# Patient Record
Sex: FEMALE | Race: OTHER
[De-identification: names, ages, dates, MRNs, and addresses within clinical notes are randomized per-mention and may not be internally consistent; named-entity substitution may affect disease eponyms.]

---

## 2022-10-19 ENCOUNTER — HOSPITAL ENCOUNTER (INPATIENT)
Dept: HOSPITAL 47 - EC | Age: 82
LOS: 6 days | Discharge: NURSING, REHAB OR LONG-TERM CARE | DRG: 643 | End: 2022-10-25
Attending: FAMILY MEDICINE | Admitting: FAMILY MEDICINE
Payer: MEDICARE

## 2022-10-19 VITALS — BODY MASS INDEX: 21.5 KG/M2

## 2022-10-19 DIAGNOSIS — I25.10: ICD-10-CM

## 2022-10-19 DIAGNOSIS — T68.XXXA: ICD-10-CM

## 2022-10-19 DIAGNOSIS — I44.7: ICD-10-CM

## 2022-10-19 DIAGNOSIS — Z79.899: ICD-10-CM

## 2022-10-19 DIAGNOSIS — Z79.84: ICD-10-CM

## 2022-10-19 DIAGNOSIS — E83.42: ICD-10-CM

## 2022-10-19 DIAGNOSIS — Z79.02: ICD-10-CM

## 2022-10-19 DIAGNOSIS — S00.81XA: ICD-10-CM

## 2022-10-19 DIAGNOSIS — E86.0: ICD-10-CM

## 2022-10-19 DIAGNOSIS — I50.21: ICD-10-CM

## 2022-10-19 DIAGNOSIS — I95.9: ICD-10-CM

## 2022-10-19 DIAGNOSIS — Z88.8: ICD-10-CM

## 2022-10-19 DIAGNOSIS — E22.2: Primary | ICD-10-CM
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
## 2022-10-19 DIAGNOSIS — U07.1: ICD-10-CM

## 2022-10-19 DIAGNOSIS — I11.0: ICD-10-CM

## 2022-10-19 DIAGNOSIS — G93.41: ICD-10-CM

## 2022-10-19 DIAGNOSIS — S81.812A: ICD-10-CM

## 2022-10-19 DIAGNOSIS — Z71.3: ICD-10-CM

## 2022-10-19 DIAGNOSIS — I48.91: ICD-10-CM

## 2022-10-19 DIAGNOSIS — E11.65: ICD-10-CM

## 2022-10-19 DIAGNOSIS — E78.5: ICD-10-CM

## 2022-10-19 DIAGNOSIS — E86.1: ICD-10-CM

## 2022-10-19 DIAGNOSIS — R26.9: ICD-10-CM

## 2022-10-19 DIAGNOSIS — E44.0: ICD-10-CM

## 2022-10-19 LAB
ALBUMIN SERPL-MCNC: 3.1 G/DL (ref 3.5–5)
ALP SERPL-CCNC: 58 U/L (ref 38–126)
ALT SERPL-CCNC: 25 U/L (ref 4–34)
ANION GAP SERPL CALC-SCNC: 9 MMOL/L
APTT BLD: 28.8 SEC (ref 22–30)
AST SERPL-CCNC: 38 U/L (ref 14–36)
BASOPHILS # BLD AUTO: 0.1 K/UL (ref 0–0.2)
BASOPHILS NFR BLD AUTO: 2 %
BUN SERPL-SCNC: 25 MG/DL (ref 7–17)
CALCIUM SPEC-MCNC: 7.4 MG/DL (ref 8.4–10.2)
CHLORIDE SERPL-SCNC: 87 MMOL/L (ref 98–107)
CK SERPL-CCNC: 634 U/L (ref 30–135)
CO2 SERPL-SCNC: 17 MMOL/L (ref 22–30)
EOSINOPHIL # BLD AUTO: 0 K/UL (ref 0–0.7)
EOSINOPHIL NFR BLD AUTO: 0 %
ERYTHROCYTE [DISTWIDTH] IN BLOOD BY AUTOMATED COUNT: 3.68 M/UL (ref 3.8–5.4)
ERYTHROCYTE [DISTWIDTH] IN BLOOD: 12.1 % (ref 11.5–15.5)
GLUCOSE BLD-MCNC: 116 MG/DL (ref 70–110)
GLUCOSE SERPL-MCNC: 202 MG/DL (ref 74–99)
HCT VFR BLD AUTO: 34 % (ref 34–46)
HGB BLD-MCNC: 12.7 GM/DL (ref 11.4–16)
INR PPP: 1.1 (ref ?–1.2)
LYMPHOCYTES # SPEC AUTO: 0.4 K/UL (ref 1–4.8)
LYMPHOCYTES NFR SPEC AUTO: 9 %
MCH RBC QN AUTO: 34.4 PG (ref 25–35)
MCHC RBC AUTO-ENTMCNC: 37.3 G/DL (ref 31–37)
MCV RBC AUTO: 92.3 FL (ref 80–100)
MONOCYTES # BLD AUTO: 0.4 K/UL (ref 0–1)
MONOCYTES NFR BLD AUTO: 9 %
NEUTROPHILS # BLD AUTO: 3.2 K/UL (ref 1.3–7.7)
NEUTROPHILS NFR BLD AUTO: 79 %
PLATELET # BLD AUTO: 224 K/UL (ref 150–450)
POTASSIUM SERPL-SCNC: 4.7 MMOL/L (ref 3.5–5.1)
PROT SERPL-MCNC: 5.1 G/DL (ref 6.3–8.2)
PT BLD: 12.1 SEC (ref 9–12)
SODIUM SERPL-SCNC: 113 MMOL/L (ref 137–145)
TROPONIN I SERPL-MCNC: 0.02 NG/ML (ref 0–0.03)
WBC # BLD AUTO: 4.1 K/UL (ref 3.8–10.6)

## 2022-10-19 PROCEDURE — 83036 HEMOGLOBIN GLYCOSYLATED A1C: CPT

## 2022-10-19 PROCEDURE — 70496 CT ANGIOGRAPHY HEAD: CPT

## 2022-10-19 PROCEDURE — 82570 ASSAY OF URINE CREATININE: CPT

## 2022-10-19 PROCEDURE — 83935 ASSAY OF URINE OSMOLALITY: CPT

## 2022-10-19 PROCEDURE — 36415 COLL VENOUS BLD VENIPUNCTURE: CPT

## 2022-10-19 PROCEDURE — 83735 ASSAY OF MAGNESIUM: CPT

## 2022-10-19 PROCEDURE — 93005 ELECTROCARDIOGRAM TRACING: CPT

## 2022-10-19 PROCEDURE — 84443 ASSAY THYROID STIM HORMONE: CPT

## 2022-10-19 PROCEDURE — 85025 COMPLETE CBC W/AUTO DIFF WBC: CPT

## 2022-10-19 PROCEDURE — 81001 URINALYSIS AUTO W/SCOPE: CPT

## 2022-10-19 PROCEDURE — 84133 ASSAY OF URINE POTASSIUM: CPT

## 2022-10-19 PROCEDURE — 70498 CT ANGIOGRAPHY NECK: CPT

## 2022-10-19 PROCEDURE — 99291 CRITICAL CARE FIRST HOUR: CPT

## 2022-10-19 PROCEDURE — 80053 COMPREHEN METABOLIC PANEL: CPT

## 2022-10-19 PROCEDURE — 82533 TOTAL CORTISOL: CPT

## 2022-10-19 PROCEDURE — 96361 HYDRATE IV INFUSION ADD-ON: CPT

## 2022-10-19 PROCEDURE — 80048 BASIC METABOLIC PNL TOTAL CA: CPT

## 2022-10-19 PROCEDURE — 84484 ASSAY OF TROPONIN QUANT: CPT

## 2022-10-19 PROCEDURE — 96365 THER/PROPH/DIAG IV INF INIT: CPT

## 2022-10-19 PROCEDURE — 71045 X-RAY EXAM CHEST 1 VIEW: CPT

## 2022-10-19 PROCEDURE — 87635 SARS-COV-2 COVID-19 AMP PRB: CPT

## 2022-10-19 PROCEDURE — 82607 VITAMIN B-12: CPT

## 2022-10-19 PROCEDURE — 83930 ASSAY OF BLOOD OSMOLALITY: CPT

## 2022-10-19 PROCEDURE — 85730 THROMBOPLASTIN TIME PARTIAL: CPT

## 2022-10-19 PROCEDURE — 84295 ASSAY OF SERUM SODIUM: CPT

## 2022-10-19 PROCEDURE — 72125 CT NECK SPINE W/O DYE: CPT

## 2022-10-19 PROCEDURE — 82550 ASSAY OF CK (CPK): CPT

## 2022-10-19 PROCEDURE — 87040 BLOOD CULTURE FOR BACTERIA: CPT

## 2022-10-19 PROCEDURE — 85610 PROTHROMBIN TIME: CPT

## 2022-10-19 PROCEDURE — 82553 CREATINE MB FRACTION: CPT

## 2022-10-19 PROCEDURE — 82746 ASSAY OF FOLIC ACID SERUM: CPT

## 2022-10-19 PROCEDURE — 93306 TTE W/DOPPLER COMPLETE: CPT

## 2022-10-19 PROCEDURE — 70450 CT HEAD/BRAIN W/O DYE: CPT

## 2022-10-19 PROCEDURE — 84300 ASSAY OF URINE SODIUM: CPT

## 2022-10-19 NOTE — CT
EXAMINATION TYPE: CT brain wo con for TPA

CT DLP: 1580.8 mGycm, Automated exposure control for dose reduction was used.

 

DATE OF EXAM: 10/19/2022 8:08 PM

 

COMPARISON: None. 

 

CLINICAL INDICATION:Female, 82 years old with history of Neuro deficit, acute, stroke suspected,

 

TECHNIQUE: 

Brain: Axial CT images of the brain were obtained with coronal and sagittal reformats created and rev
iewed.

Contrast used: None.

Oral contrast used: None.

 

FINDINGS:

 

Brain:

Extra-axial spaces: No abnormal extra-axial fluid collections.

Ventricular system: Dilatation in proportion to cerebral atrophy.

Cerebral parenchyma: Cerebral atrophy. No acute intraparenchymal hemorrhage or mass effect.  The gray
-white junction is well differentiated. Scattered hypoattenuating areas are seen within the white mat
ter.  

Cerebellum: Unremarkable.

Mass effect: No evidence of midline shift.

Intracranial vasculature: unremarkable

Soft tissues: Left frontal soft tissue edema.

Calvarium/osseous structures: No depressed skull fracture.

Paranasal sinuses and mastoid air cells: Mild scattered paranasal sinus disease.

Visualized orbits: Bilateral aphakia

 

IMPRESSION:

1. No acute intracranial process.

2. Nonspecific white matter changes, likely secondary to chronic small vessel ischemic disease.

3. Left frontal soft tissue edema.

## 2022-10-19 NOTE — CT
EXAMINATION TYPE: CT angio head neck

CT DLP: 1580.8 mGycm, Automated exposure control for dose reduction was used.

 

DATE OF EXAM: 10/19/2022 8:32 PM

 

COMPARISON: CT head same day.  

 

CLINICAL INDICATION:Female, 82 years old with history of Neuro deficit, acute, stroke suspected, foun
d unresponsive

 

TECHNIQUE: Axially acquired helical CT angiogram of the head and neck was obtained with contrast. Axi
al images are supplemented with 3D reconstructions which were post-processed at an independent workst
ation. NASCET criteria used.

Contrast used:65ml mL of Isovue 370 with IV Contrast, 

Oral contrast used: None. 

 

FINDINGS:

Motion limits evaluation.

 

CTA HEAD:

No evidence of acute intracranial hemorrhage, mass effect, or midline shift. The ventricles, sulci, a
nd cisterns are unremarkable. 

 

The visualized portions of the internal carotid arteries, middle cerebral arteries, anterior cerebral
 arteries, and posterior cerebral arteries are patent. 

 

The basilar and vertebral arteries are patent. 

 

CTA NECK:

Right Carotid System: 

The common carotid artery and external carotid artery are patent. The carotid bifurcation demonstrate
s no evidence of hemodynamically significant stenosis. The remaining portions of the internal carotid
 artery demonstrate normal size without significant narrowing.

 

Left Carotid System: 

The common carotid artery and external carotid artery are patent. The carotid bifurcation demonstrate
s no evidence of hemodynamically significant stenosis. The remaining portions of the internal carotid
 artery demonstrate normal size without significant narrowing.

 

Vertebral arteries are patent without evidence hemodynamically significant stenosis.

 

There is a three-vessel aortic arch. The origins of the great vessels are patent. No evidence of hemo
dynamically significant stenosis.

 

IMPRESSION:

1. No evidence of dissection of the cervical internal carotid arteries or vertebral arteries or any e
vidence of significant stenosis at the carotid bifurcations. 

2. No evidence of intracranial high-grade stenosis or intracranial aneurysm.

## 2022-10-19 NOTE — CT
EXAMINATION TYPE: CT cervical spine wo con

CT DLP: 1580.8 mGycm, Automated exposure control for dose reduction was used.

 

DATE OF EXAM: 10/19/2022 8:10 PM

 

COMPARISON: None

 

CLINICAL INDICATION:Female, 82 years old with history of fall injury, found unresponsive

 

TECHNIQUE: Axial CT images from the skull base to the inferior aspect of T2 we obtained without intra
venous contrast. Coronal and sagittal reformatted images were also reviewed.

 

FINDINGS:

Fracture: None.

Osseous structures: Multilevel degenerative disc disease changes with endplate spurring and disc oste
ophyte complex's. 

Vertebral alignment: Alignment within normal limits.

Spinal canal/Neural Foramina: No evidence of significant spinal canal narrowing. No evidence for sign
ificant neural foraminal stenosis.

Neck soft tissues: Prevertebral soft tissues are within normal limits.

Other: The airway is patent. The lung apices are clear.

 

IMPRESSION:

1. No evidence of cervical spine fracture.

2. Mild multilevel degenerative disc disease.

## 2022-10-19 NOTE — ED
Altered Mental Status HPI





- General


Stated Complaint: hypothermia


Source: EMS


Mode of arrival: EMS


Limitations: altered mental status





- History of Present Illness


Initial Comments: 





This patient is an 82-year-old woman brought by EMS to have evaluation for 

altered mental status.  The patient reportedly observed lying on the ground by a

neighbor.  Patient found by EMS personnel to be in wet clothing, attempting to 

crawl.  Patient's not giving any verbal responses.  The down time unknown 

however patient does feel cold to touch per ambulance personnel.


MD Complaint: altered mental status


-: unknown


Severity: severe


Consistency of Symptoms: constant


Context: unknown





- Related Data


                                Home Medications











 Medication  Instructions  Recorded  Confirmed


 


Atorvastatin [Lipitor] 20 mg PO HS 10/19/22 10/19/22


 


Clopidogrel [Plavix] 75 mg PO DAILY 10/19/22 10/19/22


 


Furosemide [Lasix] 20 mg PO DAILY 10/19/22 10/19/22


 


carvediloL [Coreg] 6.25 mg PO BID-W/MEALS 10/19/22 10/19/22


 


lisinopriL [Zestril] 10 mg PO DAILY 10/19/22 10/19/22








                                  Previous Rx's











 Medication  Instructions  Recorded


 


ALPRAZolam [Xanax] 0.5 mg PO TID PRN #9 tablet 10/25/22


 


Acetaminophen Tab [Tylenol] 1,000 mg PO Q6HR PRN  tab 10/25/22


 


Artificial Tears-Hypromellose 1 drops BOTH EYES Q4HR PRN  ml 10/25/22





[Artificial Tear Drops]  


 


Ascorbic Acid [Vitamin C] 500 mg PO DAILY  tab 10/25/22


 


Cholecalciferol [Vitamin D3 (10 10 mcg PO DAILY  tab 10/25/22





Mcg = 400 Iu)]  


 


Famotidine [Pepcid] 20 mg PO BID  tab 10/25/22


 


Folic Acid 1 mg PO DAILY  tab 10/25/22


 


Psyllium Husk 100% [Metamucil 6 gm PO BID PRN  packet 10/25/22





Packet]  


 


Sodium Chloride Tab 1 gm PO BID  tab 10/25/22


 


Zinc Sulfate [Orazinc] 220 mg PO DAILY  cap 10/25/22


 


metFORMIN HCL [Glucophage] 500 mg PO BID-W/MEALS  tab 10/25/22











                                    Allergies











Allergy/AdvReac Type Severity Reaction Status Date / Time


 


heparin AdvReac  Swelling Verified 10/24/22 08:55














Review of Systems


ROS Statement: 


Those systems with pertinent positive or pertinent negative responses have been 

documented in the HPI.





ROS Other: All systems not noted in ROS Statement are negative.


Limitations: ROS unobtainable due to patients medical condition





General Exam


Limitations: no limitations


General appearance: obtunded, other (Patient arrives covered in blankets.  Her 

clothing had been removed by EMS personnel as she was very wet.  Patient not 

responding to questions or commands.)


Head exam: Present: other (Patient has facial abrasions)


Eye exam: Present: PERRL, EOMI, nystagmus.  Absent: scleral icterus, 

conjunctival injection


Neck exam: Present: normal inspection, other (In cervical collar)


Respiratory exam: Present: rhonchi.  Absent: wheezes, rales, stridor, accessory 

muscle use, decreased breath sounds, prolonged expiratory


Cardiovascular Exam: Present: bradycardia, irregular rhythm, systolic murmur.  

Absent: diastolic murmur, rubs, gallop


GI/Abdominal exam: Present: soft.  Absent: distended, tenderness, guarding, 

rebound, rigid, mass


Extremities exam: Present: full ROM, other (Abrasions).  Absent: tenderness, 

pedal edema, calf tenderness


Back exam: Present: normal inspection.  Absent: vertebral tenderness


Neurological exam: Present: altered.  Absent: motor sensory deficit


  ** Expanded


Eye Response: (4) open spontaneously


Motor Response: (5) localizes to pain


Verbal Response: incomprehensible sounds


Skin exam: Present: mottled, abrasion, other (Patient is cold, skin is moist.  

There are multiple abrasions to all 4 extremities as well as abdomen and face.)





Course


                                   Vital Signs











  10/19/22 10/19/22 10/19/22





  18:46 20:39 20:45


 


Temperature 79.9 F L 93.6 F L 


 


Pulse Rate 53 L 51 L 58 L


 


Respiratory 18 11 L 17





Rate   


 


Blood Pressure 108/88 155/100 155/100


 


O2 Sat by Pulse 98 100 100





Oximetry   














  10/19/22 10/19/22 10/19/22





  20:54 21:00 21:15


 


Temperature 84.4 F L  


 


Pulse Rate 57 L 61 63


 


Respiratory 18 19 21





Rate   


 


Blood Pressure 137/79 137/79 129/78


 


O2 Sat by Pulse 95 96 99





Oximetry   














  10/19/22 10/19/22 10/19/22





  21:30 21:45 22:00


 


Temperature   


 


Pulse Rate 66 65 65


 


Respiratory 17 17 16





Rate   


 


Blood Pressure 137/69 111/67 131/60


 


O2 Sat by Pulse 96 100 85 L





Oximetry   














  10/19/22 10/19/22 10/19/22





  22:15 22:30 22:45


 


Temperature   


 


Pulse Rate 69 76 73


 


Respiratory 11 L 18 21





Rate   


 


Blood Pressure 90/60 91/53 113/68


 


O2 Sat by Pulse 97 96 96





Oximetry   














  10/19/22 10/19/22 10/19/22





  23:00 23:14 23:15


 


Temperature  90.9 F L 


 


Pulse Rate 71 75 73


 


Respiratory 17 18 16





Rate   


 


Blood Pressure 104/68 100/64 100/64


 


O2 Sat by Pulse 98 94 L 96





Oximetry   














- Reevaluation(s)


Reevaluation #1: 





10/19/22 22:18


Patient's family member had arrived and stated that she had not been feeling 

well for number days prior to this episode.  Patient reportedly having cough, 

generalized weakness and fatigue. She had been encouraged to take a covid test 

on Saturday but did not do so.





Medical Decision Making





- Lab Data


Result diagrams: 


                                 10/25/22 06:49





                                 10/25/22 06:49


                                   Lab Results











  10/19/22 10/19/22 10/19/22 Range/Units





  19:08 19:08 19:08 


 


WBC  4.1    (3.8-10.6)  k/uL


 


RBC  3.68 L    (3.80-5.40)  m/uL


 


Hgb  12.7    (11.4-16.0)  gm/dL


 


Hct  34.0    (34.0-46.0)  %


 


MCV  92.3    (80.0-100.0)  fL


 


MCH  34.4    (25.0-35.0)  pg


 


MCHC  37.3 H    (31.0-37.0)  g/dL


 


RDW  12.1    (11.5-15.5)  %


 


Plt Count  224    (150-450)  k/uL


 


MPV  7.1    


 


Neutrophils %  79    %


 


Lymphocytes %  9    %


 


Monocytes %  9    %


 


Eosinophils %  0    %


 


Basophils %  2    %


 


Neutrophils #  3.2    (1.3-7.7)  k/uL


 


Lymphocytes #  0.4 L    (1.0-4.8)  k/uL


 


Monocytes #  0.4    (0-1.0)  k/uL


 


Eosinophils #  0.0    (0-0.7)  k/uL


 


Basophils #  0.1    (0-0.2)  k/uL


 


PT   12.1 H   (9.0-12.0)  sec


 


INR   1.1   (<1.2)  


 


APTT   28.8   (22.0-30.0)  sec


 


Sodium    113 L*  (137-145)  mmol/L


 


Potassium    4.7  (3.5-5.1)  mmol/L


 


Chloride    87 L  ()  mmol/L


 


Carbon Dioxide    17 L  (22-30)  mmol/L


 


Anion Gap    9  mmol/L


 


BUN    25 H  (7-17)  mg/dL


 


Creatinine    0.64  (0.52-1.04)  mg/dL


 


Est GFR (CKD-EPI)AfAm    >90  (>60 ml/min/1.73 sqM)  


 


Est GFR (CKD-EPI)NonAf    82  (>60 ml/min/1.73 sqM)  


 


Glucose    202 H  (74-99)  mg/dL


 


Osmolality     (280-301)  mosm/kg


 


Calcium    7.4 L  (8.4-10.2)  mg/dL


 


Total Bilirubin    0.6  (0.2-1.3)  mg/dL


 


AST    38 H  (14-36)  U/L


 


ALT    25  (4-34)  U/L


 


Alkaline Phosphatase    58  ()  U/L


 


Creatine Kinase    634 H  ()  U/L


 


CK-MB (CK-2)     (0.0-2.4)  ng/mL


 


Troponin I     (0.000-0.034)  ng/mL


 


Total Protein    5.1 L  (6.3-8.2)  g/dL


 


Albumin    3.1 L  (3.5-5.0)  g/dL


 


Coronavirus (PCR)     (Not Detectd)  














  10/19/22 10/19/22 10/19/22 Range/Units





  19:08 19:08 21:39 


 


WBC     (3.8-10.6)  k/uL


 


RBC     (3.80-5.40)  m/uL


 


Hgb     (11.4-16.0)  gm/dL


 


Hct     (34.0-46.0)  %


 


MCV     (80.0-100.0)  fL


 


MCH     (25.0-35.0)  pg


 


MCHC     (31.0-37.0)  g/dL


 


RDW     (11.5-15.5)  %


 


Plt Count     (150-450)  k/uL


 


MPV     


 


Neutrophils %     %


 


Lymphocytes %     %


 


Monocytes %     %


 


Eosinophils %     %


 


Basophils %     %


 


Neutrophils #     (1.3-7.7)  k/uL


 


Lymphocytes #     (1.0-4.8)  k/uL


 


Monocytes #     (0-1.0)  k/uL


 


Eosinophils #     (0-0.7)  k/uL


 


Basophils #     (0-0.2)  k/uL


 


PT     (9.0-12.0)  sec


 


INR     (<1.2)  


 


APTT     (22.0-30.0)  sec


 


Sodium     (137-145)  mmol/L


 


Potassium     (3.5-5.1)  mmol/L


 


Chloride     ()  mmol/L


 


Carbon Dioxide     (22-30)  mmol/L


 


Anion Gap     mmol/L


 


BUN     (7-17)  mg/dL


 


Creatinine     (0.52-1.04)  mg/dL


 


Est GFR (CKD-EPI)AfAm     (>60 ml/min/1.73 sqM)  


 


Est GFR (CKD-EPI)NonAf     (>60 ml/min/1.73 sqM)  


 


Glucose     (74-99)  mg/dL


 


Osmolality   254 L   (280-301)  mosm/kg


 


Calcium     (8.4-10.2)  mg/dL


 


Total Bilirubin     (0.2-1.3)  mg/dL


 


AST     (14-36)  U/L


 


ALT     (4-34)  U/L


 


Alkaline Phosphatase     ()  U/L


 


Creatine Kinase     ()  U/L


 


CK-MB (CK-2)  15.4 H    (0.0-2.4)  ng/mL


 


Troponin I  0.018    (0.000-0.034)  ng/mL


 


Total Protein     (6.3-8.2)  g/dL


 


Albumin     (3.5-5.0)  g/dL


 


Coronavirus (PCR)    Detected A  (Not Detectd)  














- EKG Data


-: EKG Interpreted by Me


EKG shows normal: intervals (QRS duration 178 ms, prolonged consistent with the 

left bundle-branch block.), QRS complexes ((Bundle-branch block pattern.)


Interpretation: other (Underlying rhythm appears atrial fibrillation with slow 

ventricular response.)





Critical Care Time


Critical Care Time: Yes (45 minutes)





Disposition


Clinical Impression: 


 Hyponatremia, Hypothermia, Altered mental status, Hyperglycemia, COVID-19





Disposition: ADMITTED AS IP TO THIS [facility]


Condition: Critical


Is patient prescribed a controlled substance at d/c from ED?: No

## 2022-10-19 NOTE — XR
EXAMINATION TYPE: XR chest 1V portable

 

DATE OF EXAM: 10/19/2022 8:04 PM

 

COMPARISON: None

 

TECHNIQUE: XR chest 1V portable Portable AP radiograph of the chest.

 

CLINICAL INDICATION:Female, 82 years old with history of altered mental status; 

 

FINDINGS: 

Lungs/Pleura: There is no evidence of pleural effusion, focal consolidation, or pneumothorax.  

Pulmonary vascularity: Pulmonary vascular congestion.

Heart/mediastinum: Cardiomediastinal silhouette is enlarged and stable.

Musculoskeletal: Degenerative changes of the shoulder joints.

 

IMPRESSION: 

Cardiomegaly and mild pulmonary vascular congestion. Correlate with BNP for congestive heart failure.

## 2022-10-20 LAB
ALBUMIN SERPL-MCNC: 2.9 G/DL (ref 3.5–5)
ALP SERPL-CCNC: 52 U/L (ref 38–126)
ALT SERPL-CCNC: 28 U/L (ref 4–34)
ANION GAP SERPL CALC-SCNC: 11 MMOL/L
AST SERPL-CCNC: 53 U/L (ref 14–36)
BASOPHILS # BLD AUTO: 0 K/UL (ref 0–0.2)
BASOPHILS NFR BLD AUTO: 0 %
BUN SERPL-SCNC: 25 MG/DL (ref 7–17)
CALCIUM SPEC-MCNC: 7.9 MG/DL (ref 8.4–10.2)
CHLORIDE SERPL-SCNC: 85 MMOL/L (ref 98–107)
CO2 SERPL-SCNC: 19 MMOL/L (ref 22–30)
EOSINOPHIL # BLD AUTO: 0 K/UL (ref 0–0.7)
EOSINOPHIL NFR BLD AUTO: 0 %
ERYTHROCYTE [DISTWIDTH] IN BLOOD BY AUTOMATED COUNT: 3.73 M/UL (ref 3.8–5.4)
ERYTHROCYTE [DISTWIDTH] IN BLOOD: 12.1 % (ref 11.5–15.5)
GLUCOSE BLD-MCNC: 107 MG/DL (ref 70–110)
GLUCOSE BLD-MCNC: 139 MG/DL (ref 70–110)
GLUCOSE BLD-MCNC: 148 MG/DL (ref 70–110)
GLUCOSE BLD-MCNC: 59 MG/DL (ref 70–110)
GLUCOSE BLD-MCNC: 67 MG/DL (ref 70–110)
GLUCOSE BLD-MCNC: 88 MG/DL (ref 70–110)
GLUCOSE SERPL-MCNC: 69 MG/DL (ref 74–99)
HCT VFR BLD AUTO: 34.5 % (ref 34–46)
HGB BLD-MCNC: 12.3 GM/DL (ref 11.4–16)
HYALINE CASTS UR QL AUTO: 1 /LPF (ref 0–2)
KETONES UR QL STRIP.AUTO: (no result)
LYMPHOCYTES # SPEC AUTO: 0.2 K/UL (ref 1–4.8)
LYMPHOCYTES NFR SPEC AUTO: 2 %
MCH RBC QN AUTO: 33 PG (ref 25–35)
MCHC RBC AUTO-ENTMCNC: 35.6 G/DL (ref 31–37)
MCV RBC AUTO: 92.5 FL (ref 80–100)
MONOCYTES # BLD AUTO: 0.5 K/UL (ref 0–1)
MONOCYTES NFR BLD AUTO: 7 %
NEUTROPHILS # BLD AUTO: 7 K/UL (ref 1.3–7.7)
NEUTROPHILS NFR BLD AUTO: 90 %
PH UR: 6 [PH] (ref 5–8)
PLATELET # BLD AUTO: 265 K/UL (ref 150–450)
POTASSIUM SERPL-SCNC: 4 MMOL/L (ref 3.5–5.1)
POTASSIUM UR-SCNC: 48.1 MMOL/L (ref 25–125)
PROT SERPL-MCNC: 4.7 G/DL (ref 6.3–8.2)
RBC UR QL: 33 /HPF (ref 0–5)
SODIUM SERPL-SCNC: 115 MMOL/L (ref 137–145)
SP GR UR: 1.03 (ref 1–1.03)
SQUAMOUS UR QL AUTO: 1 /HPF (ref 0–4)
UROBILINOGEN UR QL STRIP: <2 MG/DL (ref ?–2)
WBC # BLD AUTO: 7.7 K/UL (ref 3.8–10.6)
WBC #/AREA URNS HPF: 5 /HPF (ref 0–5)

## 2022-10-20 RX ADMIN — POTASSIUM CHLORIDE SCH: 14.9 INJECTION, SOLUTION INTRAVENOUS at 08:03

## 2022-10-20 RX ADMIN — OXYCODONE HYDROCHLORIDE AND ACETAMINOPHEN SCH MG: 500 TABLET ORAL at 12:13

## 2022-10-20 RX ADMIN — HEPARIN SODIUM SCH UNIT: 5000 INJECTION INTRAVENOUS; SUBCUTANEOUS at 08:22

## 2022-10-20 RX ADMIN — ACETAMINOPHEN PRN MG: 500 TABLET ORAL at 19:54

## 2022-10-20 RX ADMIN — FAMOTIDINE SCH MG: 10 INJECTION, SOLUTION INTRAVENOUS at 19:54

## 2022-10-20 RX ADMIN — ACETAMINOPHEN PRN MG: 500 TABLET ORAL at 12:18

## 2022-10-20 RX ADMIN — HEPARIN SODIUM SCH UNIT: 5000 INJECTION INTRAVENOUS; SUBCUTANEOUS at 02:08

## 2022-10-20 RX ADMIN — Medication SCH MG: at 12:13

## 2022-10-20 RX ADMIN — HEPARIN SODIUM SCH: 5000 INJECTION INTRAVENOUS; SUBCUTANEOUS at 09:37

## 2022-10-20 RX ADMIN — SODIUM CHLORIDE TAB 1 GM SCH GM: 1 TAB at 19:54

## 2022-10-20 RX ADMIN — Medication SCH MCG: at 13:13

## 2022-10-20 RX ADMIN — POTASSIUM CHLORIDE SCH: 14.9 INJECTION, SOLUTION INTRAVENOUS at 08:02

## 2022-10-20 RX ADMIN — HEPARIN SODIUM SCH: 5000 INJECTION INTRAVENOUS; SUBCUTANEOUS at 16:03

## 2022-10-20 RX ADMIN — POTASSIUM CHLORIDE SCH: 14.9 INJECTION, SOLUTION INTRAVENOUS at 09:40

## 2022-10-20 RX ADMIN — SODIUM CHLORIDE TAB 1 GM SCH GM: 1 TAB at 16:01

## 2022-10-20 RX ADMIN — FAMOTIDINE SCH MG: 10 INJECTION, SOLUTION INTRAVENOUS at 08:22

## 2022-10-20 RX ADMIN — CLOPIDOGREL BISULFATE SCH MG: 75 TABLET ORAL at 09:16

## 2022-10-20 RX ADMIN — ATORVASTATIN CALCIUM SCH MG: 20 TABLET, FILM COATED ORAL at 19:54

## 2022-10-20 NOTE — HP
HISTORY AND PHYSICAL



HISTORY OF PRESENT ILLNESS:

An 82-year-old white female came to the EMS for altered mental status.  She was found

lying in ground by neighbor. She was hypothermic on admission, _____, attempted to

crawl, not given any verbal responses, down time unknown, severe nature.



She came in with a sodium 113, BUN is 25, creatinine 0.64.



HOME MEDICATIONS:

Include,

1. Plavix 75 mg daily.

2. Lasix 20 mg daily.

3. Aldactone 25 daily.

4. Coreg 6.25 b.i.d.

5. Zestril 10 daily.

6. Atorvastatin 20.

7. Metformin 500 b.i.d.



REVIEW OF SYSTEMS:

Unable to be attempted.  The patient not responding.



ALLERGIES:

No known drug allergies.



PHYSICAL EXAMINATION:

VITAL SIGNS: Temperature 79.9 on admission, 84.4 later on at 2054, pulse is 57,

respiratory rate 18 to 16, blood pressure is 108 to 137 over 88 to 79.  She had not

been feeling well for _____ prior to this.

GENERAL:  She has cough, weakness, fatigue.  Had COVID, untreated. She is obtunded,

covered in blankets, does not respond.

HEENT: Pupils equal, round, and reactive.

NECK:  Supple.

LUNGS:  No wheezes, rhonchi, or accessory muscles.

CARDIOVASCULAR:  Bradycardia, regular rhythm.

GI:  Soft, nontender.

EXTREMITIES:  Dry skin turgor. Cold extremities.

BACK:  She can move 4 extremities.  NEUROLOGIC:  She is altered, not responding _____.

She has abrasions to skin.  Is moaning due to multiple abrasions to all 4 extremities.



ASSESSMENT:

Severe hypothermia, probable post COVID, severe hyponatremia, history of hypertension,

possible stents, protein calorie malnutrition, acute renal insufficiency.



EKG shows prolonged QRS, left bundle-branch block, atrial fibrillation, RVR.  Prognosis

extremely guarded.  She will be rehydrated.  Treat for possible infection.  Rehydrate

her slowly with sodium replacement.  Keep renal doctor to consult for that. Intensivist

as well as Neurology for altered mental status.  Treat oxygen levels if they continue

to draw and replace sodium level slowly.  Prognosis guarded.  Resume home medications.





MMODL / IJN: [id number] / Job#: [id number]

## 2022-10-20 NOTE — P.CNPUL
History of Present Illness


Consult date: 10/20/22


Requesting physician: Jaswant Prescott


Reason for consult: other (ICU management)


Chief complaint: Altered mental status


History of present illness: 





This is an 82-year-old female with history of hypertension, coronary artery 

disease, diabetes, poor historian, patient presented to the ER yesterday shortly

after she was found by her neighbor in her garage, on the ground, attempting to 

crawl, and she had wet clothing and multiple areas of bruises throughout her 

whole body, and superficial abrasions.  Apparently the patient was very 

confused, she was hypothermic when she arrived to the ER with a temp of 84.  

Patient was also noted to have low sodium of 113.  Patient is normally on 

diuretics including Lasix 20 mg daily and on Aldactone 25 mg daily, she is also 

on multiple cardiac meds including Lipitor, Plavix, Coreg, and Zestril.  At any 

rate it is not clear how long with the patient down in the garage, however 

considering her altered mental status she had extensive scanning of her brain, 

and all came back nondiagnostic.  Patient also had a CT angiogram of the head, 

came back unremarkable.  Patient was admitted to the ICU mostly because of her 

hypothermia and her hyponatremia.  I felt that her hyponatremia is hypovolemic 

in nature, patient had low sodium of 113, and she had a relatively elevated 

urine osmolality.  Initially the patient was given 3% saline, however as she was

noted to have relatively low blood pressure, we recommended stopping the 3% 

saline and she was given a liter of lactated Ringer's.  Then her lactated 

Ringer's was changed to 75 mL per hour and follow up sodium this morning is 116.

 Patient will remain on lactated Ringer's at 50 mL per hour, and we will 

continue to monitor her sodium, no more 3% saline was given.  In the meantime 

patient was tested for COVID-19 infection, and sure enough she tested positive. 

Supposedly the patient is vaccinated and boosted.  Patient had no symptoms of 

shortness of breath, she had no pulmonary symptoms whatsoever, and her chest x-

ray is basically relatively unremarkable.  Hence this was an incidental COVID-19

positive finding





Review of Systems


ROS unobtainable: due to mental status





Past Medical History


History of Any Multi-Drug Resistant Organisms: None Reported


Smoking Status: Never smoker


Past Alcohol Use History: None Reported


Past Drug Use History: None Reported





Medications and Allergies


                                Home Medications











 Medication  Instructions  Recorded  Confirmed  Type


 


Atorvastatin [Lipitor] 20 mg PO HS 10/19/22 10/19/22 History


 


Clopidogrel [Plavix] 75 mg PO DAILY 10/19/22 10/19/22 History


 


Furosemide [Lasix] 20 mg PO DAILY 10/19/22 10/19/22 History


 


Spironolactone [Aldactone] 25 mg PO DAILY 10/19/22 10/19/22 History


 


carvediloL [Coreg] 6.25 mg PO BID-W/MEALS 10/19/22 10/19/22 History


 


lisinopriL [Zestril] 10 mg PO DAILY 10/19/22 10/19/22 History


 


metFORMIN HCL [Glucophage] 500 mg PO BID-W/MEALS 10/19/22 10/19/22 History








                                    Allergies











Allergy/AdvReac Type Severity Reaction Status Date / Time


 


No Known Allergies Allergy   Unverified 10/19/22 21:05














Physical Exam


Vitals: 


                                   Vital Signs











  Temp Pulse Resp BP Pulse Ox


 


 10/20/22 10:00  98.6 F  85  17  104/67  88 L


 


 10/20/22 09:00   81  45 H  95/49  99


 


 10/20/22 08:27      100


 


 10/20/22 08:00  98.4 F  84  15  88/54  95


 


 10/20/22 07:30   88  21  87/49  99


 


 10/20/22 07:00   80  12  101/56  97


 


 10/20/22 06:30   84  28 H  101/49  99


 


 10/20/22 06:00   84  24  106/51  99


 


 10/20/22 05:45   80  21  106/51  98


 


 10/20/22 05:30   79  14  101/48  94 L


 


 10/20/22 05:15   81  47 H  101/48  100


 


 10/20/22 05:04   80  29 H  101/48  98


 


 10/20/22 04:45   84  21  75/40  100


 


 10/20/22 04:30   80  10 L  80/46  97


 


 10/20/22 04:15   90  23  80/46  99


 


 10/20/22 04:00  97.3 F L  79  17  87/50  100


 


 10/20/22 03:45   81  18  87/50  98


 


 10/20/22 03:30  97.0 F L  81  26 H  95/51  100


 


 10/20/22 03:15   84  16  95/51  99


 


 10/20/22 03:04   80  22  62/43  86 L


 


 10/20/22 02:45   81  11 L  85/69  99


 


 10/20/22 02:31   84  24  85/69  100


 


 10/20/22 02:18   82  25 H  80/50  99


 


 10/20/22 02:06   80  15  93/51  99


 


 10/20/22 01:53   78  12  85/49  97


 


 10/20/22 01:30  95.7 F L  72  18  87/49  90 L


 


 10/20/22 01:15   80  15  72/47  100


 


 10/20/22 01:00   76  20  87/53  98


 


 10/20/22 00:45   80  49 H  72/51  99


 


 10/20/22 00:30   85  26 H  92/59  100


 


 10/20/22 00:15   75  10 L  92/59  98


 


 10/20/22 00:00  95 F L  78  26 H  109/65  93 L


 


 10/19/22 23:45   76  25 H  109/65  98


 


 10/19/22 23:30     108/66 


 


 10/19/22 23:15   73  16  100/64  96


 


 10/19/22 23:14  90.9 F L  75  18  100/64  94 L


 


 10/19/22 23:00   71  17  104/68  98


 


 10/19/22 22:45   73  21  113/68  96


 


 10/19/22 22:30   76  18  91/53  96


 


 10/19/22 22:15   69  11 L  90/60  97


 


 10/19/22 22:00   65  16  131/60  85 L


 


 10/19/22 21:45   65  17  111/67  100


 


 10/19/22 21:30   66  17  137/69  96


 


 10/19/22 21:15   63  21  129/78  99


 


 10/19/22 21:00   61  19  137/79  96


 


 10/19/22 20:54  84.4 F L  57 L  18  137/79  95


 


 10/19/22 20:45   58 L  17  155/100  100


 


 10/19/22 20:39  93.6 F L  51 L  11 L  155/100  100


 


 10/19/22 18:46  79.9 F L  53 L  18  108/88  98








                                Intake and Output











 10/19/22 10/20/22 10/20/22





 22:59 06:59 14:59


 


Intake Total  100 1050


 


Output Total  655 305


 


Balance  -555 745


 


Intake:   


 


  IV  100 1050


 


    Lactated Ringers 1,000 ml   50





    @ 50 mls/hr IV .Q20H [facility]   





    Rx#:[id number]   


 


    Lactated Ringers 1,000 ml   1000





    @ 999 mls/hr IV .Q1H1M   





    [facility] Rx#:[id number]   


 


    Sodium Chloride 3%(  100 0





    Hypertonic) 500 ml @ 25   





    mls/hr IV .Q20H [facility] Rx#:   





    [id number]   


 


Output:   


 


  Urine  655 305


 


Other:   


 


  Voiding Method  Indwelling Catheter Indwelling Catheter


 


  Weight 65.771 kg 68.039 kg 68.039 kg














Physical Exam: Revealed an 82-year-old female in no distress, on room air.


Head: normocephalic.  Multiple abrasions noted on her face.  And throughout her 

whole body


HEENT:[Neck is supple.] [No neck masses.] [No thyromegaly.] [No JVD.]


Chest: [Clear throughout, no crackles, no rhonchi, no wheezes.]


Cardiac Exam: Irregular irregular rhythm.  [Normal S1 and S2, no S3 gallop, no 

murmur.]


Abdomen: [Soft, nontender,  no megaly, no rebound, no guarding, normal bowel 

sounds.]


Extremities: [No clubbing, no edema, no cyanosis.]


Neurological Exam: Patient is awake, she initially said they year is 1922, then 

she remembered is 2022.,  She also knew that she was at [facility].  Could

not give me any details about her medical history.  And who her doctor is.  

Obviously the patient is intermittently confused.


Psychiatric: Anxious, flat affect, intermittently confused.


Skin: Multiple bruises and abrasions noted throughout her whole body.





Results





- Laboratory Findings


CBC and BMP: 


                                 10/20/22 03:25





                                 10/20/22 10:25


PT/INR, D-dimer











PT  12.1 sec (9.0-12.0)  H  10/19/22  19:08    


 


INR  1.1  (<1.2)   10/19/22  19:08    








Abnormal lab findings: 


                                  Abnormal Labs











  10/19/22 10/19/22 10/19/22





  19:08 19:08 19:08


 


RBC  3.68 L  


 


MCHC  37.3 H  


 


Lymphocytes #  0.4 L  


 


PT   12.1 H 


 


Sodium    113 L*


 


Chloride    87 L


 


Carbon Dioxide    17 L


 


BUN    25 H


 


Glucose    202 H


 


POC Glucose (mg/dL)   


 


Osmolality   


 


Calcium    7.4 L


 


AST    38 H


 


Creatine Kinase    634 H


 


CK-MB (CK-2)   


 


Total Protein    5.1 L


 


Albumin    3.1 L


 


Urine Protein   


 


Urine Ketones   


 


Urine Blood   


 


Urine RBC   


 


Urine Mucus   


 


Coronavirus (PCR)   














  10/19/22 10/19/22 10/19/22





  19:08 19:08 21:39


 


RBC   


 


MCHC   


 


Lymphocytes #   


 


PT   


 


Sodium   


 


Chloride   


 


Carbon Dioxide   


 


BUN   


 


Glucose   


 


POC Glucose (mg/dL)   


 


Osmolality   254 L 


 


Calcium   


 


AST   


 


Creatine Kinase   


 


CK-MB (CK-2)  15.4 H  


 


Total Protein   


 


Albumin   


 


Urine Protein   


 


Urine Ketones   


 


Urine Blood   


 


Urine RBC   


 


Urine Mucus   


 


Coronavirus (PCR)    Detected A














  10/19/22 10/20/22 10/20/22





  23:39 03:25 03:25


 


RBC   3.73 L 


 


MCHC   


 


Lymphocytes #   0.2 L 


 


PT   


 


Sodium    115 L*


 


Chloride    85 L


 


Carbon Dioxide    19 L


 


BUN    25 H


 


Glucose    69 L


 


POC Glucose (mg/dL)  116 H  


 


Osmolality   


 


Calcium    7.9 L


 


AST    53 H


 


Creatine Kinase   


 


CK-MB (CK-2)   


 


Total Protein    4.7 L


 


Albumin    2.9 L


 


Urine Protein   


 


Urine Ketones   


 


Urine Blood   


 


Urine RBC   


 


Urine Mucus   


 


Coronavirus (PCR)   














  10/20/22 10/20/22 10/20/22





  03:25 06:07 07:14


 


RBC   


 


MCHC   


 


Lymphocytes #   


 


PT   


 


Sodium  115 L*  116 L* 


 


Chloride   


 


Carbon Dioxide   


 


BUN   


 


Glucose   


 


POC Glucose (mg/dL)    59 L


 


Osmolality   


 


Calcium   


 


AST   


 


Creatine Kinase   


 


CK-MB (CK-2)   


 


Total Protein   


 


Albumin   


 


Urine Protein   


 


Urine Ketones   


 


Urine Blood   


 


Urine RBC   


 


Urine Mucus   


 


Coronavirus (PCR)   














  10/20/22 10/20/22 10/20/22





  07:34 09:20 10:25


 


RBC   


 


MCHC   


 


Lymphocytes #   


 


PT   


 


Sodium    116 L*


 


Chloride   


 


Carbon Dioxide   


 


BUN   


 


Glucose   


 


POC Glucose (mg/dL)  67 L  


 


Osmolality   


 


Calcium   


 


AST   


 


Creatine Kinase   


 


CK-MB (CK-2)   


 


Total Protein   


 


Albumin   


 


Urine Protein   Trace H 


 


Urine Ketones   2+ H 


 


Urine Blood   Small H 


 


Urine RBC   33 H 


 


Urine Mucus   Rare H 


 


Coronavirus (PCR)   














- Diagnostic Findings


Chest x-ray: image reviewed (As noted in HPI)





Assessment and Plan


Assessment: 





Impression:


Hypothermia secondary to fall and unable to get out of her garage for a number 

of hours./Unknown time.


Hypovolemic hyponatremia


Altered mental status consistent with acute metabolic encephalopathy


COVID-19 infection, however she has no active pulmonary symptoms.


History of benign essential hypertension


History of congestive heart failure, maintained on diuretics.


History of type 2 diabetes.


Coronary arteriosclerosis.


Dyslipidemia.


Multiple abrasions and bruises secondary to fall





Recommendation:


Continue slow hydration and slow correction of her sodium, monitor sodium every 

4 hours and address accordingly.


Continue lactated Ringer's for now at 50 mL per hour, discontinue 3% saline.


Continue to monitor in the ICU.


Her hypothermia has been addressed and corrected accordingly.


Discontinue antibiotics.  No clear-cut evidence of infection.


Expect mental status to improve after treatment of her hypothermia and 

hyponatremia


Resume home meds except continue to hold diuretics for now.


Continue Pepcid for the GI prophylaxis


Xanax to be given for anxiety.


COVID-19 cocktail for incidental finding of COVID-19 infection.  Remind you 

patient is vaccinated and boosted


 to evaluate for placement


Time with Patient: Greater than 30

## 2022-10-20 NOTE — P.NPCON
History of Present Illness





- Reason for Consult


hyponatremia





- History of Present Illness





Patient is an 82-year-old female with history of hypertension, diabetes, and 

dyslipidemia.  She is admitted to the hospital with history of altered 

mentation.  Patient was found on the floor by and the neighbor, she was 

hypothermic.


Patient was noted to have a serum sodium of 113.  It appears that patient had 

attempted to crawl for some time.


No history of nausea vomiting abdominal pain or diarrhea


Patient is maintained on Lasix and ACE inhibitor's at home





Patient was maintained on 3% saline which was turned off this morning when serum

sodium was 1:15.  Currently she is on LR and last sodium was 116


Blood pressure has been low





Review of Systems





As per HPI, other systems negative





Past Medical History


History of Any Multi-Drug Resistant Organisms: None Reported


Smoking Status: Never smoker


Past Alcohol Use History: None Reported


Past Drug Use History: None Reported





Medications and Allergies


                                Home Medications











 Medication  Instructions  Recorded  Confirmed  Type


 


Atorvastatin [Lipitor] 20 mg PO HS 10/19/22 10/19/22 History


 


Clopidogrel [Plavix] 75 mg PO DAILY 10/19/22 10/19/22 History


 


Furosemide [Lasix] 20 mg PO DAILY 10/19/22 10/19/22 History


 


Spironolactone [Aldactone] 25 mg PO DAILY 10/19/22 10/19/22 History


 


carvediloL [Coreg] 6.25 mg PO BID-W/MEALS 10/19/22 10/19/22 History


 


lisinopriL [Zestril] 10 mg PO DAILY 10/19/22 10/19/22 History


 


metFORMIN HCL [Glucophage] 500 mg PO BID-W/MEALS 10/19/22 10/19/22 History








                                    Allergies











Allergy/AdvReac Type Severity Reaction Status Date / Time


 


No Known Allergies Allergy   Unverified 10/19/22 21:05














Physical Exam


Vitals: 


                                   Vital Signs











  Temp Pulse Resp BP Pulse Ox


 


 10/20/22 12:00  98.6 F  79  26 H  106/58  95


 


 10/20/22 11:00   86  18  98/57  97


 


 10/20/22 10:00  98.6 F  85  17  104/67  88 L


 


 10/20/22 09:00   81  45 H  95/49  99


 


 10/20/22 08:27      100


 


 10/20/22 08:00  98.4 F  84  15  88/54  95


 


 10/20/22 07:30   88  21  87/49  99


 


 10/20/22 07:00   80  12  101/56  97


 


 10/20/22 06:30   84  28 H  101/49  99


 


 10/20/22 06:00   84  24  106/51  99


 


 10/20/22 05:45   80  21  106/51  98


 


 10/20/22 05:30   79  14  101/48  94 L


 


 10/20/22 05:15   81  47 H  101/48  100


 


 10/20/22 05:04   80  29 H  101/48  98


 


 10/20/22 04:45   84  21  75/40  100


 


 10/20/22 04:30   80  10 L  80/46  97


 


 10/20/22 04:15   90  23  80/46  99


 


 10/20/22 04:00  97.3 F L  79  17  87/50  100


 


 10/20/22 03:45   81  18  87/50  98


 


 10/20/22 03:30  97.0 F L  81  26 H  95/51  100


 


 10/20/22 03:15   84  16  95/51  99


 


 10/20/22 03:04   80  22  62/43  86 L


 


 10/20/22 02:45   81  11 L  85/69  99


 


 10/20/22 02:31   84  24  85/69  100


 


 10/20/22 02:18   82  25 H  80/50  99


 


 10/20/22 02:06   80  15  93/51  99


 


 10/20/22 01:53   78  12  85/49  97


 


 10/20/22 01:30  95.7 F L  72  18  87/49  90 L


 


 10/20/22 01:15   80  15  72/47  100


 


 10/20/22 01:00   76  20  87/53  98


 


 10/20/22 00:45   80  49 H  72/51  99


 


 10/20/22 00:30   85  26 H  92/59  100


 


 10/20/22 00:15   75  10 L  92/59  98


 


 10/20/22 00:00  95 F L  78  26 H  109/65  93 L


 


 10/19/22 23:45   76  25 H  109/65  98


 


 10/19/22 23:30     108/66 


 


 10/19/22 23:15   73  16  100/64  96


 


 10/19/22 23:14  90.9 F L  75  18  100/64  94 L


 


 10/19/22 23:00   71  17  104/68  98


 


 10/19/22 22:45   73  21  113/68  96


 


 10/19/22 22:30   76  18  91/53  96


 


 10/19/22 22:15   69  11 L  90/60  97


 


 10/19/22 22:00   65  16  131/60  85 L


 


 10/19/22 21:45   65  17  111/67  100


 


 10/19/22 21:30   66  17  137/69  96


 


 10/19/22 21:15   63  21  129/78  99


 


 10/19/22 21:00   61  19  137/79  96


 


 10/19/22 20:54  84.4 F L  57 L  18  137/79  95


 


 10/19/22 20:45   58 L  17  155/100  100


 


 10/19/22 20:39  93.6 F L  51 L  11 L  155/100  100


 


 10/19/22 18:46  79.9 F L  53 L  18  108/88  98








                                Intake and Output











 10/19/22 10/20/22 10/20/22





 22:59 06:59 14:59


 


Intake Total  100 1050


 


Output Total  655 369


 


Balance  -555 681


 


Intake:   


 


  IV  100 1050


 


    Lactated Ringers 1,000 ml   50





    @ 50 mls/hr IV .Q20H [facility]   





    Rx#:[id number]   


 


    Lactated Ringers 1,000 ml   1000





    @ 999 mls/hr IV .Q1H1M   





    [facility] Rx#:[id number]   


 


    Sodium Chloride 3%(  100 0





    Hypertonic) 500 ml @ 25   





    mls/hr IV .Q20H [facility] Rx#:   





    [id number]   


 


Output:   


 


  Urine  655 369


 


Other:   


 


  Voiding Method  Indwelling Catheter Indwelling Catheter


 


  Weight 65.771 kg 68.039 kg 68.039 kg














Awake, not in any acute distress


Alert and oriented 3


Examination of the heart S1 and S2


Examination of the lungs bilateral breath sounds are heard


Abdomen is soft nontender


Examination lower extremity shows significant discoloration and the toes with 

superficial ulcerations





Results





- Lab Results


                             Most recent lab results











Calcium  7.9 mg/dL (8.4-10.2)  L  10/20/22  03:25    














                                 10/20/22 03:25





                                 10/20/22 10:25





Assessment and Plan


Assessment: 





1.  Hyponatremia appears to be hypovolemic currently improving with LR , status 

post 3% saline


2.  Hypothermia


3.  COVID-19 infection, currently on room air


4.  History of hypertension currently blood pressure medications on hold as 

blood pressure is low


Plan: 





Repeat sodium in 4 hours


I will add sodium chloride tabs depending on repeat sodium


Encourage increased oral intake


Continue to hold off on antihypertensive medications for now





Thank you for the consultation we will continue to follow the patient with you 

during her hospitalization
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
## 2022-10-21 LAB
ALBUMIN SERPL-MCNC: 2.4 G/DL (ref 3.5–5)
ALP SERPL-CCNC: 47 U/L (ref 38–126)
ALT SERPL-CCNC: 32 U/L (ref 4–34)
ANION GAP SERPL CALC-SCNC: 7 MMOL/L
AST SERPL-CCNC: 57 U/L (ref 14–36)
BASOPHILS # BLD AUTO: 0 K/UL (ref 0–0.2)
BASOPHILS NFR BLD AUTO: 0 %
BUN SERPL-SCNC: 14 MG/DL (ref 7–17)
CALCIUM SPEC-MCNC: 7.5 MG/DL (ref 8.4–10.2)
CHLORIDE SERPL-SCNC: 89 MMOL/L (ref 98–107)
CO2 SERPL-SCNC: 21 MMOL/L (ref 22–30)
EOSINOPHIL # BLD AUTO: 0 K/UL (ref 0–0.7)
EOSINOPHIL NFR BLD AUTO: 0 %
ERYTHROCYTE [DISTWIDTH] IN BLOOD BY AUTOMATED COUNT: 2.81 M/UL (ref 3.8–5.4)
ERYTHROCYTE [DISTWIDTH] IN BLOOD: 12.4 % (ref 11.5–15.5)
GLUCOSE BLD-MCNC: 103 MG/DL (ref 70–110)
GLUCOSE BLD-MCNC: 124 MG/DL (ref 70–110)
GLUCOSE BLD-MCNC: 139 MG/DL (ref 70–110)
GLUCOSE BLD-MCNC: 96 MG/DL (ref 70–110)
GLUCOSE SERPL-MCNC: 108 MG/DL (ref 74–99)
HCT VFR BLD AUTO: 26.7 % (ref 34–46)
HGB BLD-MCNC: 9.5 GM/DL (ref 11.4–16)
LYMPHOCYTES # SPEC AUTO: 0.2 K/UL (ref 1–4.8)
LYMPHOCYTES NFR SPEC AUTO: 4 %
MCH RBC QN AUTO: 33.9 PG (ref 25–35)
MCHC RBC AUTO-ENTMCNC: 35.7 G/DL (ref 31–37)
MCV RBC AUTO: 95 FL (ref 80–100)
MONOCYTES # BLD AUTO: 0.4 K/UL (ref 0–1)
MONOCYTES NFR BLD AUTO: 9 %
NEUTROPHILS # BLD AUTO: 3.5 K/UL (ref 1.3–7.7)
NEUTROPHILS NFR BLD AUTO: 84 %
PLATELET # BLD AUTO: 212 K/UL (ref 150–450)
POTASSIUM SERPL-SCNC: 4.2 MMOL/L (ref 3.5–5.1)
PROT SERPL-MCNC: 4.1 G/DL (ref 6.3–8.2)
SODIUM SERPL-SCNC: 117 MMOL/L (ref 137–145)
WBC # BLD AUTO: 4.1 K/UL (ref 3.8–10.6)

## 2022-10-21 RX ADMIN — POTASSIUM CHLORIDE SCH: 14.9 INJECTION, SOLUTION INTRAVENOUS at 07:24

## 2022-10-21 RX ADMIN — ATORVASTATIN CALCIUM SCH MG: 20 TABLET, FILM COATED ORAL at 20:33

## 2022-10-21 RX ADMIN — FAMOTIDINE SCH MG: 20 TABLET, FILM COATED ORAL at 20:33

## 2022-10-21 RX ADMIN — SODIUM CHLORIDE TAB 1 GM SCH GM: 1 TAB at 08:21

## 2022-10-21 RX ADMIN — HEPARIN SODIUM SCH: 5000 INJECTION INTRAVENOUS; SUBCUTANEOUS at 16:39

## 2022-10-21 RX ADMIN — HEPARIN SODIUM SCH: 5000 INJECTION INTRAVENOUS; SUBCUTANEOUS at 08:23

## 2022-10-21 RX ADMIN — Medication SCH MCG: at 08:21

## 2022-10-21 RX ADMIN — HEPARIN SODIUM SCH: 5000 INJECTION INTRAVENOUS; SUBCUTANEOUS at 00:15

## 2022-10-21 RX ADMIN — CLOPIDOGREL BISULFATE SCH MG: 75 TABLET ORAL at 08:21

## 2022-10-21 RX ADMIN — ACETAMINOPHEN PRN MG: 500 TABLET ORAL at 03:46

## 2022-10-21 RX ADMIN — Medication SCH MG: at 08:21

## 2022-10-21 RX ADMIN — SODIUM CHLORIDE TAB 1 GM SCH GM: 1 TAB at 20:33

## 2022-10-21 RX ADMIN — FAMOTIDINE SCH MG: 10 INJECTION, SOLUTION INTRAVENOUS at 08:21

## 2022-10-21 RX ADMIN — ACETAMINOPHEN PRN MG: 500 TABLET ORAL at 11:46

## 2022-10-21 RX ADMIN — OXYCODONE HYDROCHLORIDE AND ACETAMINOPHEN SCH MG: 500 TABLET ORAL at 08:20

## 2022-10-21 NOTE — PN
PROGRESS NOTE



SUBJECTIVE:

White female, remains in the ICU.  She has multiple excoriations or bruising and

superficial abrasions over the anterior legs in the knees, her facial area, her arms.

She came in with severe hypothermia and was placed in the ICU.  She had been re-warmed

up to the room temperature.  She is saturating in the high 90s on 2 to 3 L.  She had

recent COVID with severe dehydration and prerenal azotemia.  She is being rehydrated

and re-warmed.



ASSESSMENT:

1. Severe hypothermia.

2. COVID-19.

3. Severe prerenal azotemia.

4. Possible sepsis.



PLAN:

Continue with current treatment in the ICU.  Treat for COVID.  Rehydrate.  Treat

abrasions.  Treat malnutrition and protein-calorie malnutrition.  Prognosis is

extremely guarded.  The patient remains in the ICU.





MMODL / IJN: [id number] / Job#: [id number]

## 2022-10-21 NOTE — P.PN
Subjective





Patient is seen for follow-up for hyponatremia.


Patient has been hypotensive and hypovolemic on admission.  She was initially 

started on 3% saline which was discontinued and switched to LR.  Patient's serum

sodium dropped and she was restarted on 3% saline last night.


Urine sodium was 77 and urine osmolality was 477.





Patient is maintained on oral sodium chloride tabs.  Overall she is doing much 

better.





Objective





- Vital Signs


Vital signs: 


                                   Vital Signs











Temp  96.1 F L  10/21/22 10:00


 


Pulse  76   10/21/22 11:00


 


Resp  23   10/21/22 11:00


 


BP  122/72   10/21/22 11:00


 


Pulse Ox  96   10/21/22 11:00


 


FiO2      








                                 Intake & Output











 10/20/22 10/21/22 10/21/22





 18:59 06:59 18:59


 


Intake Total 3270 650 350


 


Output Total 629 560 165


 


Balance 2641 90 185


 


Weight 68.039 kg 67.2 kg 


 


Intake:   


 


  IV 1350 650 100


 


    Lactated Ringers 1,000 ml 350 650 





    @ 50 mls/hr IV .Q20H [facility]   





    Rx#:[id number]   


 


    Lactated Ringers 1,000 ml 1000  





    @ 999 mls/hr IV .Q1H1M   





    [facility] Rx#:[id number]   


 


    Sodium Chloride 3%( 0  100





    Hypertonic) 500 ml @ 25   





    mls/hr IV .Q20H [facility] Rx#:   





    [id number]   


 


  Oral 1920  250


 


Output:   


 


  Urine 629 560 165


 


Other:   


 


  Voiding Method Indwelling Catheter Indwelling Catheter Indwelling Catheter














- Exam





Awake, comfortable, not in any acute distress


Examination of the heart S1 and S2


Examination of the lungs bilateral breath sounds are heard


Abdomen is soft nontender


Examination of lower extremity shows some discoloration and superficial 

ulceration in the toes.  No edema noted


CNS exam grossly intact





- Labs


CBC & Chem 7: 


                                 10/21/22 10:05





                                 10/21/22 06:00


Labs: 


                  Abnormal Lab Results - Last 24 Hours (Table)











  10/20/22 10/20/22 10/20/22 Range/Units





  14:54 16:50 19:22 


 


RBC     (3.80-5.40)  m/uL


 


Hgb     (11.4-16.0)  gm/dL


 


Hct     (34.0-46.0)  %


 


Lymphocytes #     (1.0-4.8)  k/uL


 


Sodium  117 L*   118 L*  (137-145)  mmol/L


 


POC Glucose (mg/dL)   148 H   ()  mg/dL














  10/20/22 10/20/22 10/21/22 Range/Units





  20:10 22:38 01:56 


 


RBC     (3.80-5.40)  m/uL


 


Hgb     (11.4-16.0)  gm/dL


 


Hct     (34.0-46.0)  %


 


Lymphocytes #     (1.0-4.8)  k/uL


 


Sodium   117 L*  116 L*  (137-145)  mmol/L


 


POC Glucose (mg/dL)  139 H    ()  mg/dL














  10/21/22 10/21/22 Range/Units





  06:00 10:05 


 


RBC   2.81 L  (3.80-5.40)  m/uL


 


Hgb   9.5 L D  (11.4-16.0)  gm/dL


 


Hct   26.7 L  (34.0-46.0)  %


 


Lymphocytes #   0.2 L  (1.0-4.8)  k/uL


 


Sodium  117 L*   (137-145)  mmol/L


 


POC Glucose (mg/dL)    ()  mg/dL








                      Microbiology - Last 24 Hours (Table)











 10/19/22 19:08 Blood Culture - Preliminary





 Blood    No Growth after 24 hours














Assessment and Plan


Assessment: 





1.  Hyponatremia appears to be hypovolemic, restarted on 3% saline last night as

sodium had dropped with LR.  Maintained on sodium chloride tabs which I will 

continue.  Patient is also advised to increase oral intake particularly protein


2.  Hypothermia


3.  COVID-19 infection, currently on room air


4.  History of hypertension currently blood pressure medications on hold as 

blood pressure is low


Plan: 





Repeat sodium level today


DC 3% saline if sodium is better than 117


Continue with sodium chloride tabs


Add ensure/Boost and continue to encourage increased oral intake

## 2022-10-21 NOTE — P.PN
Subjective


Progress Note Date: 10/21/22


Principal diagnosis: 





Acute hypovolemic hyponatremia and COVID-19 infection





This is an 82-year-old female with history of hypertension, coronary artery 

disease, diabetes, poor historian, patient presented to the ER yesterday shortly

after she was found by her neighbor in her garage, on the ground, attempting to 

crawl, and she had wet clothing and multiple areas of bruises throughout her 

whole body, and superficial abrasions.  Apparently the patient was very 

confused, she was hypothermic when she arrived to the ER with a temp of 84.  

Patient was also noted to have low sodium of 113.  Patient is normally on 

diuretics including Lasix 20 mg daily and on Aldactone 25 mg daily, she is also 

on multiple cardiac meds including Lipitor, Plavix, Coreg, and Zestril.  At any 

rate it is not clear how long with the patient down in the garage, however 

considering her altered mental status she had extensive scanning of her brain, 

and all came back nondiagnostic.  Patient also had a CT angiogram of the head, 

came back unremarkable.  Patient was admitted to the ICU mostly because of her 

hypothermia and her hyponatremia.  I felt that her hyponatremia is hypovolemic 

in nature, patient had low sodium of 113, and she had a relatively elevated 

urine osmolality.  Initially the patient was given 3% saline, however as she was

noted to have relatively low blood pressure, we recommended stopping the 3% 

saline and she was given a liter of lactated Ringer's.  Then her lactated 

Ringer's was changed to 75 mL per hour and follow up sodium this morning is 116.

 Patient will remain on lactated Ringer's at 50 mL per hour, and we will 

continue to monitor her sodium, no more 3% saline was given.  In the meantime 

patient was tested for COVID-19 infection, and sure enough she tested positive. 

Supposedly the patient is vaccinated and boosted.  Patient had no symptoms of 

shortness of breath, she had no pulmonary symptoms whatsoever, and her chest x-

ray is basically relatively unremarkable.  Hence this was an incidental COVID-19

positive finding





Patient was reevaluated today on 10/21/22, remains in the ICU, sodium remains 

low, today is 117, hence the patient was seen by nephrology and she is back on 

3% saline.  She is also on oral sodium tablets.  Clinically the patient is 

feeling better, she is not in any distress, she continues to hold relatively low

temperature, today I'm recommending thyroid profile and a serum cortisol level. 

Her cortisol level came back normal/12 and her thyroid profile is pending her 

WBC count is 4.1 hemoglobin is 9.5, electrolytes are normal except for low 

sodium of 117 renal profile is normal











Objective





- Vital Signs


Vital signs: 


                                   Vital Signs











Temp  96.1 F L  10/21/22 10:00


 


Pulse  76   10/21/22 11:00


 


Resp  23   10/21/22 11:00


 


BP  122/72   10/21/22 11:00


 


Pulse Ox  96   10/21/22 11:00


 


FiO2      








                                 Intake & Output











 10/20/22 10/21/22 10/21/22





 18:59 06:59 18:59


 


Intake Total 3270 650 350


 


Output Total 629 560 165


 


Balance 2641 90 185


 


Weight 68.039 kg 67.2 kg 


 


Intake:   


 


  IV 1350 650 100


 


    Lactated Ringers 1,000 ml 350 650 





    @ 50 mls/hr IV .Q20H [facility]   





    Rx#:[id number]   


 


    Lactated Ringers 1,000 ml 1000  





    @ 999 mls/hr IV .Q1H1M   





    [facility] Rx#:[id number]   


 


    Sodium Chloride 3%( 0  100





    Hypertonic) 500 ml @ 25   





    mls/hr IV .Q20H [facility] Rx#:   





    [id number]   


 


  Oral 1920  250


 


Output:   


 


  Urine 629 560 165


 


Other:   


 


  Voiding Method Indwelling Catheter Indwelling Catheter Indwelling Catheter














- Exam





Physical Exam: Revealed an 82-year-old female in no distress, on room air.


Head: normocephalic.  Multiple abrasions noted on her face.  And throughout her 

whole body


HEENT:[Neck is supple.] [No neck masses.] [No thyromegaly.] [No JVD.]


Chest: [Clear throughout, no crackles, no rhonchi, no wheezes.]


Cardiac Exam: Irregular irregular rhythm.  [Normal S1 and S2, no S3 gallop, no 

murmur.]


Abdomen: [Soft, nontender,  no megaly, no rebound, no guarding, normal bowel 

sounds.]


Extremities: [No clubbing, no edema, no cyanosis.]


Neurological Exam: Alert oriented 3, no gross focal deficits.


Psychiatric: Less anxious today, normal affect, normal mental status


Skin: Multiple bruises and abrasions noted throughout her whole body.











- Labs


CBC & Chem 7: 


                                 10/21/22 10:05





                                 10/21/22 10:05


Labs: 


                  Abnormal Lab Results - Last 24 Hours (Table)











  10/20/22 10/20/22 10/20/22 Range/Units





  14:54 16:50 19:22 


 


RBC     (3.80-5.40)  m/uL


 


Hgb     (11.4-16.0)  gm/dL


 


Hct     (34.0-46.0)  %


 


Lymphocytes #     (1.0-4.8)  k/uL


 


Sodium  117 L*   118 L*  (137-145)  mmol/L


 


Chloride     ()  mmol/L


 


Carbon Dioxide     (22-30)  mmol/L


 


Creatinine     (0.52-1.04)  mg/dL


 


Glucose     (74-99)  mg/dL


 


POC Glucose (mg/dL)   148 H   ()  mg/dL


 


Calcium     (8.4-10.2)  mg/dL


 


AST     (14-36)  U/L


 


Total Protein     (6.3-8.2)  g/dL


 


Albumin     (3.5-5.0)  g/dL














  10/20/22 10/20/22 10/21/22 Range/Units





  20:10 22:38 01:56 


 


RBC     (3.80-5.40)  m/uL


 


Hgb     (11.4-16.0)  gm/dL


 


Hct     (34.0-46.0)  %


 


Lymphocytes #     (1.0-4.8)  k/uL


 


Sodium   117 L*  116 L*  (137-145)  mmol/L


 


Chloride     ()  mmol/L


 


Carbon Dioxide     (22-30)  mmol/L


 


Creatinine     (0.52-1.04)  mg/dL


 


Glucose     (74-99)  mg/dL


 


POC Glucose (mg/dL)  139 H    ()  mg/dL


 


Calcium     (8.4-10.2)  mg/dL


 


AST     (14-36)  U/L


 


Total Protein     (6.3-8.2)  g/dL


 


Albumin     (3.5-5.0)  g/dL














  10/21/22 10/21/22 10/21/22 Range/Units





  06:00 10:05 10:05 


 


RBC    2.81 L  (3.80-5.40)  m/uL


 


Hgb    9.5 L D  (11.4-16.0)  gm/dL


 


Hct    26.7 L  (34.0-46.0)  %


 


Lymphocytes #    0.2 L  (1.0-4.8)  k/uL


 


Sodium  117 L*  117 L*   (137-145)  mmol/L


 


Chloride   89 L   ()  mmol/L


 


Carbon Dioxide   21 L   (22-30)  mmol/L


 


Creatinine   0.50 L   (0.52-1.04)  mg/dL


 


Glucose   108 H   (74-99)  mg/dL


 


POC Glucose (mg/dL)     ()  mg/dL


 


Calcium   7.5 L   (8.4-10.2)  mg/dL


 


AST   57 H   (14-36)  U/L


 


Total Protein   4.1 L   (6.3-8.2)  g/dL


 


Albumin   2.4 L   (3.5-5.0)  g/dL








                      Microbiology - Last 24 Hours (Table)











 10/19/22 19:08 Blood Culture - Preliminary





 Blood    No Growth after 24 hours














Assessment and Plan


Assessment: 





Impression:


Hypovolemic hyponatremia


Altered mental status consistent with acute metabolic encephalopathy, improving 

since yesterday


COVID-19 infection, however she has no active pulmonary symptoms.


History of benign essential hypertension


History of congestive heart failure, maintained on diuretics.


History of type 2 diabetes.


Coronary arteriosclerosis.


Dyslipidemia.


Multiple abrasions and bruises secondary to fall





Recommendation:


Hyponatremia is being addressed by nephrology, patient is now on 3% saline.


Continue to monitor in the ICU.  Until her sodium is up to 123.  Or higher


Continue to hold diuretics for now.


Continue Pepcid for the GI prophylaxis


Continue Xanax


Continue COVID-19 cocktail


We will continue to follow


Time with Patient: Less than 30

## 2022-10-22 LAB
ALBUMIN SERPL-MCNC: 2.7 G/DL (ref 3.5–5)
ALP SERPL-CCNC: 52 U/L (ref 38–126)
ALT SERPL-CCNC: 37 U/L (ref 4–34)
ANION GAP SERPL CALC-SCNC: 6 MMOL/L
AST SERPL-CCNC: 45 U/L (ref 14–36)
BASOPHILS # BLD AUTO: 0 K/UL (ref 0–0.2)
BASOPHILS NFR BLD AUTO: 0 %
BUN SERPL-SCNC: 12 MG/DL (ref 7–17)
CALCIUM SPEC-MCNC: 7.7 MG/DL (ref 8.4–10.2)
CHLORIDE SERPL-SCNC: 88 MMOL/L (ref 98–107)
CO2 SERPL-SCNC: 27 MMOL/L (ref 22–30)
EOSINOPHIL # BLD AUTO: 0 K/UL (ref 0–0.7)
EOSINOPHIL NFR BLD AUTO: 1 %
ERYTHROCYTE [DISTWIDTH] IN BLOOD BY AUTOMATED COUNT: 3.24 M/UL (ref 3.8–5.4)
ERYTHROCYTE [DISTWIDTH] IN BLOOD: 12.7 % (ref 11.5–15.5)
GLUCOSE BLD-MCNC: 103 MG/DL (ref 70–110)
GLUCOSE BLD-MCNC: 127 MG/DL (ref 70–110)
GLUCOSE BLD-MCNC: 63 MG/DL (ref 70–110)
GLUCOSE BLD-MCNC: 77 MG/DL (ref 70–110)
GLUCOSE BLD-MCNC: 89 MG/DL (ref 70–110)
GLUCOSE SERPL-MCNC: 96 MG/DL (ref 74–99)
HCT VFR BLD AUTO: 30.8 % (ref 34–46)
HGB BLD-MCNC: 11 GM/DL (ref 11.4–16)
LYMPHOCYTES # SPEC AUTO: 0.3 K/UL (ref 1–4.8)
LYMPHOCYTES NFR SPEC AUTO: 6 %
MCH RBC QN AUTO: 34 PG (ref 25–35)
MCHC RBC AUTO-ENTMCNC: 35.8 G/DL (ref 31–37)
MCV RBC AUTO: 95.1 FL (ref 80–100)
MONOCYTES # BLD AUTO: 0.4 K/UL (ref 0–1)
MONOCYTES NFR BLD AUTO: 8 %
NEUTROPHILS # BLD AUTO: 3.6 K/UL (ref 1.3–7.7)
NEUTROPHILS NFR BLD AUTO: 82 %
PLATELET # BLD AUTO: 237 K/UL (ref 150–450)
POTASSIUM SERPL-SCNC: 4 MMOL/L (ref 3.5–5.1)
PROT SERPL-MCNC: 4.5 G/DL (ref 6.3–8.2)
SODIUM SERPL-SCNC: 119 MMOL/L (ref 137–145)
SODIUM SERPL-SCNC: 121 MMOL/L (ref 137–145)
VIT B12 SERPL-MCNC: 1414 PG/ML (ref 200–944)
WBC # BLD AUTO: 4.3 K/UL (ref 3.8–10.6)

## 2022-10-22 RX ADMIN — SODIUM CHLORIDE TAB 1 GM SCH GM: 1 TAB at 08:10

## 2022-10-22 RX ADMIN — SODIUM CHLORIDE TAB 1 GM SCH GM: 1 TAB at 20:40

## 2022-10-22 RX ADMIN — FUROSEMIDE SCH MG: 10 INJECTION, SOLUTION INTRAMUSCULAR; INTRAVENOUS at 09:48

## 2022-10-22 RX ADMIN — FAMOTIDINE SCH MG: 20 TABLET, FILM COATED ORAL at 20:40

## 2022-10-22 RX ADMIN — ATORVASTATIN CALCIUM SCH MG: 20 TABLET, FILM COATED ORAL at 20:40

## 2022-10-22 RX ADMIN — Medication SCH MG: at 08:10

## 2022-10-22 RX ADMIN — HEPARIN SODIUM SCH: 5000 INJECTION INTRAVENOUS; SUBCUTANEOUS at 23:34

## 2022-10-22 RX ADMIN — HEPARIN SODIUM SCH: 5000 INJECTION INTRAVENOUS; SUBCUTANEOUS at 15:24

## 2022-10-22 RX ADMIN — Medication SCH MCG: at 08:10

## 2022-10-22 RX ADMIN — SODIUM CHLORIDE SCH MLS/HR: 3 INJECTION, SOLUTION INTRAVENOUS at 21:47

## 2022-10-22 RX ADMIN — OXYCODONE HYDROCHLORIDE AND ACETAMINOPHEN SCH MG: 500 TABLET ORAL at 08:10

## 2022-10-22 RX ADMIN — PSYLLIUM HUSK PRN GM: 3.4 POWDER ORAL at 08:10

## 2022-10-22 RX ADMIN — FAMOTIDINE SCH MG: 20 TABLET, FILM COATED ORAL at 08:10

## 2022-10-22 RX ADMIN — CLOPIDOGREL BISULFATE SCH MG: 75 TABLET ORAL at 08:10

## 2022-10-22 RX ADMIN — HEPARIN SODIUM SCH: 5000 INJECTION INTRAVENOUS; SUBCUTANEOUS at 00:00

## 2022-10-22 RX ADMIN — FUROSEMIDE SCH MG: 10 INJECTION, SOLUTION INTRAMUSCULAR; INTRAVENOUS at 20:40

## 2022-10-22 RX ADMIN — HEPARIN SODIUM SCH UNIT: 5000 INJECTION INTRAVENOUS; SUBCUTANEOUS at 08:09

## 2022-10-22 NOTE — P.PN
Subjective


Progress Note Date: 10/21/22





Patient was seen for follow-up.  Patient was asleep.  On waking up, patient 

offers no complaints.  Denies headache.  No new neurological symptoms.  Patient 

is laying comfortably.  Bruises have improved.





Objective





- Vital Signs


Vital signs: 


                                   Vital Signs











Temp  97.5 F L  10/21/22 12:00


 


Pulse  76   10/21/22 19:00


 


Resp  13   10/21/22 19:00


 


BP  137/68   10/21/22 19:00


 


Pulse Ox  96   10/21/22 19:00


 


FiO2      








                                 Intake & Output











 10/21/22 10/21/22 10/22/22





 06:59 18:59 06:59


 


Intake Total 650 955 100


 


Output Total 560 395 25


 


Balance 90 560 75


 


Weight 67.2 kg  


 


Intake:   


 


   125 


 


    Lactated Ringers 1,000 ml 650  





    @ 50 mls/hr IV .Q20H [facility]   





    Rx#:[id number]   


 


    Sodium Chloride 3%(  125 





    Hypertonic) 500 ml @ 25   





    mls/hr IV .Q20H [facility] Rx#:   





    [id number]   


 


  Oral  830 100


 


Output:   


 


  Urine 560 395 25


 


Other:   


 


  Voiding Method Indwelling Catheter Indwelling Catheter 














- Exam





As above.  Detail examination deferred.





- Labs


CBC & Chem 7: 


                                 10/22/22 05:43





                                 10/22/22 05:43


Labs: 


                  Abnormal Lab Results - Last 24 Hours (Table)











  10/20/22 10/20/22 10/20/22 Range/Units





  19:22 20:10 22:38 


 


RBC     (3.80-5.40)  m/uL


 


Hgb     (11.4-16.0)  gm/dL


 


Hct     (34.0-46.0)  %


 


Lymphocytes #     (1.0-4.8)  k/uL


 


Sodium  118 L*   117 L*  (137-145)  mmol/L


 


Chloride     ()  mmol/L


 


Carbon Dioxide     (22-30)  mmol/L


 


Creatinine     (0.52-1.04)  mg/dL


 


Glucose     (74-99)  mg/dL


 


POC Glucose (mg/dL)   139 H   ()  mg/dL


 


Calcium     (8.4-10.2)  mg/dL


 


AST     (14-36)  U/L


 


Total Protein     (6.3-8.2)  g/dL


 


Albumin     (3.5-5.0)  g/dL














  10/21/22 10/21/22 10/21/22 Range/Units





  01:56 06:00 10:05 


 


RBC     (3.80-5.40)  m/uL


 


Hgb     (11.4-16.0)  gm/dL


 


Hct     (34.0-46.0)  %


 


Lymphocytes #     (1.0-4.8)  k/uL


 


Sodium  116 L*  117 L*  117 L*  (137-145)  mmol/L


 


Chloride    89 L  ()  mmol/L


 


Carbon Dioxide    21 L  (22-30)  mmol/L


 


Creatinine    0.50 L  (0.52-1.04)  mg/dL


 


Glucose    108 H  (74-99)  mg/dL


 


POC Glucose (mg/dL)     ()  mg/dL


 


Calcium    7.5 L  (8.4-10.2)  mg/dL


 


AST    57 H  (14-36)  U/L


 


Total Protein    4.1 L  (6.3-8.2)  g/dL


 


Albumin    2.4 L  (3.5-5.0)  g/dL














  10/21/22 10/21/22 10/21/22 Range/Units





  10:05 12:19 16:35 


 


RBC  2.81 L    (3.80-5.40)  m/uL


 


Hgb  9.5 L D    (11.4-16.0)  gm/dL


 


Hct  26.7 L    (34.0-46.0)  %


 


Lymphocytes #  0.2 L    (1.0-4.8)  k/uL


 


Sodium   119 L*   (137-145)  mmol/L


 


Chloride     ()  mmol/L


 


Carbon Dioxide     (22-30)  mmol/L


 


Creatinine     (0.52-1.04)  mg/dL


 


Glucose     (74-99)  mg/dL


 


POC Glucose (mg/dL)    124 H  ()  mg/dL


 


Calcium     (8.4-10.2)  mg/dL


 


AST     (14-36)  U/L


 


Total Protein     (6.3-8.2)  g/dL


 


Albumin     (3.5-5.0)  g/dL














  10/21/22 Range/Units





  18:18 


 


RBC   (3.80-5.40)  m/uL


 


Hgb   (11.4-16.0)  gm/dL


 


Hct   (34.0-46.0)  %


 


Lymphocytes #   (1.0-4.8)  k/uL


 


Sodium  119 L*  (137-145)  mmol/L


 


Chloride   ()  mmol/L


 


Carbon Dioxide   (22-30)  mmol/L


 


Creatinine   (0.52-1.04)  mg/dL


 


Glucose   (74-99)  mg/dL


 


POC Glucose (mg/dL)   ()  mg/dL


 


Calcium   (8.4-10.2)  mg/dL


 


AST   (14-36)  U/L


 


Total Protein   (6.3-8.2)  g/dL


 


Albumin   (3.5-5.0)  g/dL








                      Microbiology - Last 24 Hours (Table)











 10/19/22 19:08 Blood Culture - Preliminary





 Blood    No Growth after 24 hours














Assessment and Plan


Assessment: 





* Status post fall in cold weather, laying out in cold for hours (unknown 

  duration) before passing out.


* Hypothermia, resolved


* Severe hyponatremia, improving


* Acute Covid-19 positive


* Hypotension, rule out sepsis, now improved.


* History of Hypertension


* Multiple abrasions and bruises secondary to fall.


Plan: 





* Patient remembers falling, and also subsequently laying on the floor.  It was 

  not a seizure.


* Patient's mentation at present is completely normal.  Her encephalopathy has 

  resolved.


* Treatment of hyponatremia and other medical conditions as per IM and other 

  specialties.  Sodium persisting 119.


* CTA of head and neck showed no stenosis or occlusion.


* Await B12, folate, TSH 0.645 normal.


* No other neurological workup indicated.

## 2022-10-22 NOTE — PN
PROGRESS NOTE



SUBJECTIVE:

Acute hypovolemic hyponatremic, COVID-19 infection, came in with hypothermia, which has

been fixed.  She has multiple abrasions and bruising over her body.  She remains in

ICU.



Her sodium remains low, it was 111 on admission, now is 117.  Nephrology put her on 3%

saline, oral sodium tablets.  She is holding relatively low temperatures today.  We are

going to do a cortisol and thyroid level on her.



Hemoglobin is 9.3, white count 4.1, sodium 117, potassium 4.2.



OBJECTIVE:

CARDIOVASCULAR:  S1, S2.

ABDOMEN:  Soft.

EXTREMITIES:  No cyanosis, clubbing, or edema.

NEUROLOGIC: Cranial nerves intact.

PSYCH:  Fair mood and affect.

HEENT: Normocephalic, atraumatic.



ASSESSMENT:

Hypovolemic hyponatremia, altered mental status, metabolic encephalopathy with COVID-19

and severe hyponatremia is the cause.  Hyponatremia secondary to dehydration, coronary

atherosclerosis, dyslipidemia, multiple abrasions and bruising, history of congestive

heart failure, hypertension, type 2 diabetes.  3% saline.  Monitor in ICU until sodium

is 123 or above. Continue her Pepcid, Xanax, COVID-19 cocktail.  Hold diuretics due to

hyponatremia.  Prognosis guarded.  Please see further orders in ICU.





MMODL / IJN: [id number] / Job#: [id number]

## 2022-10-22 NOTE — P.PN
Subjective





Patient is seen in follow-up for hyponatremia.  Patient received a dose of IV 

Lasix last night.  Sodium level improved to 121 this morning.  Urine output 

improved post IV Lasix but the last 2 hours has been about 40 mL an hour.  Oral 

intake is just fair.  Blood pressure stable.





Vital signs are stable.


General: Awake.


HEENT: Bruising noted.  On nasal cannula.


LUNGS: Breath sounds decreased.


HEART: Rate and Rhythm are regular.


ABDOMEN: Soft, no distention.


EXTREMITITES: 1+ edema.  Generalized bruising noted.





Objective





- Vital Signs


Vital signs: 


                                   Vital Signs











Temp  98.0 F   10/22/22 08:00


 


Pulse  78   10/22/22 08:00


 


Resp  18   10/22/22 08:00


 


BP  139/65   10/22/22 08:00


 


Pulse Ox  94 L  10/22/22 08:00


 


FiO2      








                                 Intake & Output











 10/21/22 10/22/22 10/22/22





 18:59 06:59 18:59


 


Intake Total 955 100 


 


Output Total 395 1535 155


 


Balance 560 -1435 -155


 


Weight  67.5 kg 


 


Intake:   


 


    


 


    Sodium Chloride 3%( 125  





    Hypertonic) 500 ml @ 25   





    mls/hr IV .Q20H [facility] Rx#:   





    [id number]   


 


  Oral 830 100 


 


Output:   


 


  Urine 395 1535 155


 


Other:   


 


  Voiding Method Indwelling Catheter Indwelling Catheter 














- Labs


CBC & Chem 7: 


                                 10/22/22 05:43





                                 10/22/22 05:43


Labs: 


                  Abnormal Lab Results - Last 24 Hours (Table)











  10/21/22 10/21/22 10/21/22 Range/Units





  10:05 10:05 12:19 


 


RBC   2.81 L   (3.80-5.40)  m/uL


 


Hgb   9.5 L D   (11.4-16.0)  gm/dL


 


Hct   26.7 L   (34.0-46.0)  %


 


Lymphocytes #   0.2 L   (1.0-4.8)  k/uL


 


Sodium  117 L*   119 L*  (137-145)  mmol/L


 


Chloride  89 L    ()  mmol/L


 


Carbon Dioxide  21 L    (22-30)  mmol/L


 


Creatinine  0.50 L    (0.52-1.04)  mg/dL


 


Glucose  108 H    (74-99)  mg/dL


 


POC Glucose (mg/dL)     ()  mg/dL


 


Calcium  7.5 L    (8.4-10.2)  mg/dL


 


AST  57 H    (14-36)  U/L


 


ALT     (4-34)  U/L


 


Total Protein  4.1 L    (6.3-8.2)  g/dL


 


Albumin  2.4 L    (3.5-5.0)  g/dL














  10/21/22 10/21/22 10/21/22 Range/Units





  16:35 18:18 20:21 


 


RBC     (3.80-5.40)  m/uL


 


Hgb     (11.4-16.0)  gm/dL


 


Hct     (34.0-46.0)  %


 


Lymphocytes #     (1.0-4.8)  k/uL


 


Sodium   119 L*   (137-145)  mmol/L


 


Chloride     ()  mmol/L


 


Carbon Dioxide     (22-30)  mmol/L


 


Creatinine     (0.52-1.04)  mg/dL


 


Glucose     (74-99)  mg/dL


 


POC Glucose (mg/dL)  124 H   139 H  ()  mg/dL


 


Calcium     (8.4-10.2)  mg/dL


 


AST     (14-36)  U/L


 


ALT     (4-34)  U/L


 


Total Protein     (6.3-8.2)  g/dL


 


Albumin     (3.5-5.0)  g/dL














  10/21/22 10/22/22 10/22/22 Range/Units





  21:38 05:43 05:43 


 


RBC   3.24 L   (3.80-5.40)  m/uL


 


Hgb   11.0 L   (11.4-16.0)  gm/dL


 


Hct   30.8 L   (34.0-46.0)  %


 


Lymphocytes #   0.3 L   (1.0-4.8)  k/uL


 


Sodium  119 L*   121 L  (137-145)  mmol/L


 


Chloride    88 L  ()  mmol/L


 


Carbon Dioxide     (22-30)  mmol/L


 


Creatinine    0.45 L  (0.52-1.04)  mg/dL


 


Glucose     (74-99)  mg/dL


 


POC Glucose (mg/dL)     ()  mg/dL


 


Calcium    7.7 L  (8.4-10.2)  mg/dL


 


AST    45 H  (14-36)  U/L


 


ALT    37 H  (4-34)  U/L


 


Total Protein    4.5 L  (6.3-8.2)  g/dL


 


Albumin    2.7 L  (3.5-5.0)  g/dL














  10/22/22 Range/Units





  06:23 


 


RBC   (3.80-5.40)  m/uL


 


Hgb   (11.4-16.0)  gm/dL


 


Hct   (34.0-46.0)  %


 


Lymphocytes #   (1.0-4.8)  k/uL


 


Sodium   (137-145)  mmol/L


 


Chloride   ()  mmol/L


 


Carbon Dioxide   (22-30)  mmol/L


 


Creatinine   (0.52-1.04)  mg/dL


 


Glucose   (74-99)  mg/dL


 


POC Glucose (mg/dL)  63 L  ()  mg/dL


 


Calcium   (8.4-10.2)  mg/dL


 


AST   (14-36)  U/L


 


ALT   (4-34)  U/L


 


Total Protein   (6.3-8.2)  g/dL


 


Albumin   (3.5-5.0)  g/dL








                      Microbiology - Last 24 Hours (Table)











 10/19/22 19:08 Blood Culture - Preliminary





 Blood    No Growth after 48 hours














Assessment and Plan


Plan: 





Assessment:


1.  Hyponatremia.  Slightly hypervolemic.  Also component of poor intake and 

SIADH from pain and infection.  Urine sodium 77 and urine osmolality 477.  TSH 

and cortisol normal.


2.  Status post fall.


3.  COVID-19 infection.


4.  Benign hypertension.  Controlled.





Plan:


1200 mL fluid resection.


Encouraged oral intake, particularly protein.


Maintain sodium chloride tabs.


Add IV Lasix 20 mg twice daily.


Repeat sodium level at noon.

## 2022-10-22 NOTE — P.PN
Subjective


Progress Note Date: 10/22/22


Principal diagnosis: 





Acute hypovolemic hyponatremia and COVID-19 infection





This is an 82-year-old female with history of hypertension, coronary artery 

disease, diabetes, poor historian, patient presented to the ER yesterday shortly

after she was found by her neighbor in her garage, on the ground, attempting to 

crawl, and she had wet clothing and multiple areas of bruises throughout her 

whole body, and superficial abrasions.  Apparently the patient was very 

confused, she was hypothermic when she arrived to the ER with a temp of 84.  

Patient was also noted to have low sodium of 113.  Patient is normally on 

diuretics including Lasix 20 mg daily and on Aldactone 25 mg daily, she is also 

on multiple cardiac meds including Lipitor, Plavix, Coreg, and Zestril.  At any 

rate it is not clear how long with the patient down in the garage, however 

considering her altered mental status she had extensive scanning of her brain, 

and all came back nondiagnostic.  Patient also had a CT angiogram of the head, 

came back unremarkable.  Patient was admitted to the ICU mostly because of her 

hypothermia and her hyponatremia.  I felt that her hyponatremia is hypovolemic 

in nature, patient had low sodium of 113, and she had a relatively elevated 

urine osmolality.  Initially the patient was given 3% saline, however as she was

noted to have relatively low blood pressure, we recommended stopping the 3% 

saline and she was given a liter of lactated Ringer's.  Then her lactated 

Ringer's was changed to 75 mL per hour and follow up sodium this morning is 116.

 Patient will remain on lactated Ringer's at 50 mL per hour, and we will 

continue to monitor her sodium, no more 3% saline was given.  In the meantime 

patient was tested for COVID-19 infection, and sure enough she tested positive. 

Supposedly the patient is vaccinated and boosted.  Patient had no symptoms of 

shortness of breath, she had no pulmonary symptoms whatsoever, and her chest x-

ray is basically relatively unremarkable.  Hence this was an incidental COVID-19

positive finding





Patient was reevaluated today on 10/21/22, remains in the ICU, sodium remains 

low, today is 117, hence the patient was seen by nephrology and she is back on 

3% saline.  She is also on oral sodium tablets.  Clinically the patient is 

feeling better, she is not in any distress, she continues to hold relatively low

temperature, today I'm recommending thyroid profile and a serum cortisol level. 

Her cortisol level came back normal/12 and her thyroid profile is pending her 

WBC count is 4.1 hemoglobin is 9.5, electrolytes are normal except for low 

sodium of 117 renal profile is normal





Reevaluated today on 10/22/22, patient is gradually improving, her sodium is up 

to 121, this is being addressed by nephrology on the case.  Pulmonary-wise the 

patient is not in any distress, her mentation is significantly improved.  And I 

believe the COVID-19 infection is rather incidental.  Patient does not have any 

symptoms to suggest COVID-19 pneumonia.  Hence I'm planning to transfer the 

patient out of the ICU to a regular medical floor today.  Labs from today were 

all reviewed renal profile is normal BUN is 12 creatinine 0.45, bicarb is 27 and

sodium is 121











Objective





- Vital Signs


Vital signs: 


                                   Vital Signs











Temp  98.0 F   10/22/22 08:00


 


Pulse  74   10/22/22 10:00


 


Resp  13   10/22/22 10:00


 


BP  130/68   10/22/22 10:00


 


Pulse Ox  100   10/22/22 10:00


 


FiO2      








                                 Intake & Output











 10/21/22 10/22/22 10/22/22





 18:59 06:59 18:59


 


Intake Total 955 100 600


 


Output Total 395 1535 155


 


Balance 560 -1435 445


 


Weight  67.5 kg 


 


Intake:   


 


    


 


    Sodium Chloride 3%( 125  





    Hypertonic) 500 ml @ 25   





    mls/hr IV [facility]   





    [id number]   


 


  Oral 830 100 600


 


Output:   


 


  Urine 395 1535 155


 


Other:   


 


  Voiding Method Indwelling Catheter Indwelling Catheter Indwelling Catheter














- Exam





Physical Exam: Revealed an 82-year-old female in no distress, on room air.


Head: normocephalic.  Multiple abrasions noted on her face.  And throughout her 

whole body


HEENT:[Neck is supple.] [No neck masses.] [No thyromegaly.] [No JVD.]


Chest: [Clear throughout, no crackles, no rhonchi, no wheezes.]


Cardiac Exam: Irregular irregular rhythm.  [Normal S1 and S2, no S3 gallop, no 

murmur.]


Abdomen: [Soft, nontender,  no megaly, no rebound, no guarding, normal bowel 

sounds.]


Extremities: [No clubbing, no edema, no cyanosis.]


Neurological Exam: Alert oriented 3, no gross focal deficits.


Psychiatric: Less anxious today, normal affect, normal mental status


Skin: Multiple bruises and abrasions noted throughout her whole body.











- Labs


CBC & Chem 7: 


                                 10/22/22 05:43





                                 10/22/22 05:43


Labs: 


                  Abnormal Lab Results - Last 24 Hours (Table)











  10/21/22 10/21/22 10/21/22 Range/Units





  10:05 12:19 16:35 


 


RBC     (3.80-5.40)  m/uL


 


Hgb     (11.4-16.0)  gm/dL


 


Hct     (34.0-46.0)  %


 


Lymphocytes #     (1.0-4.8)  k/uL


 


Sodium  117 L*  119 L*   (137-145)  mmol/L


 


Chloride  89 L    ()  mmol/L


 


Carbon Dioxide  21 L    (22-30)  mmol/L


 


Creatinine  0.50 L    (0.52-1.04)  mg/dL


 


Glucose  108 H    (74-99)  mg/dL


 


POC Glucose (mg/dL)    124 H  ()  mg/dL


 


Calcium  7.5 L    (8.4-10.2)  mg/dL


 


AST  57 H    (14-36)  U/L


 


ALT     (4-34)  U/L


 


Total Protein  4.1 L    (6.3-8.2)  g/dL


 


Albumin  2.4 L    (3.5-5.0)  g/dL














  10/21/22 10/21/22 10/21/22 Range/Units





  18:18 20:21 21:38 


 


RBC     (3.80-5.40)  m/uL


 


Hgb     (11.4-16.0)  gm/dL


 


Hct     (34.0-46.0)  %


 


Lymphocytes #     (1.0-4.8)  k/uL


 


Sodium  119 L*   119 L*  (137-145)  mmol/L


 


Chloride     ()  mmol/L


 


Carbon Dioxide     (22-30)  mmol/L


 


Creatinine     (0.52-1.04)  mg/dL


 


Glucose     (74-99)  mg/dL


 


POC Glucose (mg/dL)   139 H   ()  mg/dL


 


Calcium     (8.4-10.2)  mg/dL


 


AST     (14-36)  U/L


 


ALT     (4-34)  U/L


 


Total Protein     (6.3-8.2)  g/dL


 


Albumin     (3.5-5.0)  g/dL














  10/22/22 10/22/22 10/22/22 Range/Units





  05:43 05:43 06:23 


 


RBC  3.24 L    (3.80-5.40)  m/uL


 


Hgb  11.0 L    (11.4-16.0)  gm/dL


 


Hct  30.8 L    (34.0-46.0)  %


 


Lymphocytes #  0.3 L    (1.0-4.8)  k/uL


 


Sodium   121 L   (137-145)  mmol/L


 


Chloride   88 L   ()  mmol/L


 


Carbon Dioxide     (22-30)  mmol/L


 


Creatinine   0.45 L   (0.52-1.04)  mg/dL


 


Glucose     (74-99)  mg/dL


 


POC Glucose (mg/dL)    63 L  ()  mg/dL


 


Calcium   7.7 L   (8.4-10.2)  mg/dL


 


AST   45 H   (14-36)  U/L


 


ALT   37 H   (4-34)  U/L


 


Total Protein   4.5 L   (6.3-8.2)  g/dL


 


Albumin   2.7 L   (3.5-5.0)  g/dL








                      Microbiology - Last 24 Hours (Table)











 10/19/22 19:08 Blood Culture - Preliminary





 Blood    No Growth after 48 hours














Assessment and Plan


Assessment: 





Impression:


Hypovolemic hyponatremia


Altered mental status consistent with acute metabolic encephalopathy, improving 

since yesterday


COVID-19 infection, however she has no active pulmonary symptoms.


History of benign essential hypertension


History of congestive heart failure, maintained on diuretics.


History of type 2 diabetes.


Coronary arteriosclerosis.


Dyslipidemia.


Multiple abrasions and bruises secondary to fall





Recommendation:


Clinically the patient is extremely dry and dehydrated hence I believe the 

patient would benefit from IV fluids rather than diuretics.


Transfer patient out of the ICU to a regular medical floor


Suggest continue to hold diuresis for now.  Again clinically the patient is 

extremely dehydrated.


Continue Pepcid for the GI prophylaxis


Continue Xanax


Continue COVID-19 cocktail


Transferred to regular medical floor today.


We will continue to follow


Time with Patient: Less than 30

## 2022-10-23 LAB
ALBUMIN SERPL-MCNC: 2.8 G/DL (ref 3.5–5)
ALP SERPL-CCNC: 52 U/L (ref 38–126)
ALT SERPL-CCNC: 32 U/L (ref 4–34)
ANION GAP SERPL CALC-SCNC: 6 MMOL/L
AST SERPL-CCNC: 26 U/L (ref 14–36)
BASOPHILS # BLD AUTO: 0 K/UL (ref 0–0.2)
BASOPHILS NFR BLD AUTO: 0 %
BUN SERPL-SCNC: 15 MG/DL (ref 7–17)
CALCIUM SPEC-MCNC: 8.1 MG/DL (ref 8.4–10.2)
CHLORIDE SERPL-SCNC: 91 MMOL/L (ref 98–107)
CO2 SERPL-SCNC: 28 MMOL/L (ref 22–30)
EOSINOPHIL # BLD AUTO: 0 K/UL (ref 0–0.7)
EOSINOPHIL NFR BLD AUTO: 1 %
ERYTHROCYTE [DISTWIDTH] IN BLOOD BY AUTOMATED COUNT: 3.29 M/UL (ref 3.8–5.4)
ERYTHROCYTE [DISTWIDTH] IN BLOOD: 12.6 % (ref 11.5–15.5)
GLUCOSE BLD-MCNC: 105 MG/DL (ref 70–110)
GLUCOSE BLD-MCNC: 115 MG/DL (ref 70–110)
GLUCOSE BLD-MCNC: 116 MG/DL (ref 70–110)
GLUCOSE BLD-MCNC: 168 MG/DL (ref 70–110)
GLUCOSE SERPL-MCNC: 107 MG/DL (ref 74–99)
HCT VFR BLD AUTO: 31.1 % (ref 34–46)
HGB BLD-MCNC: 11.2 GM/DL (ref 11.4–16)
LYMPHOCYTES # SPEC AUTO: 0.3 K/UL (ref 1–4.8)
LYMPHOCYTES NFR SPEC AUTO: 6 %
MAGNESIUM SPEC-SCNC: 1.4 MG/DL (ref 1.6–2.3)
MCH RBC QN AUTO: 34.1 PG (ref 25–35)
MCHC RBC AUTO-ENTMCNC: 36.1 G/DL (ref 31–37)
MCV RBC AUTO: 94.4 FL (ref 80–100)
MONOCYTES # BLD AUTO: 0.5 K/UL (ref 0–1)
MONOCYTES NFR BLD AUTO: 11 %
NEUTROPHILS # BLD AUTO: 3.9 K/UL (ref 1.3–7.7)
NEUTROPHILS NFR BLD AUTO: 79 %
PLATELET # BLD AUTO: 296 K/UL (ref 150–450)
POTASSIUM SERPL-SCNC: 4.1 MMOL/L (ref 3.5–5.1)
PROT SERPL-MCNC: 4.7 G/DL (ref 6.3–8.2)
SODIUM SERPL-SCNC: 125 MMOL/L (ref 137–145)
WBC # BLD AUTO: 4.9 K/UL (ref 3.8–10.6)

## 2022-10-23 RX ADMIN — Medication SCH MCG: at 08:37

## 2022-10-23 RX ADMIN — ATORVASTATIN CALCIUM SCH MG: 20 TABLET, FILM COATED ORAL at 20:28

## 2022-10-23 RX ADMIN — MAGNESIUM SULFATE IN DEXTROSE SCH MLS/HR: 10 INJECTION, SOLUTION INTRAVENOUS at 09:58

## 2022-10-23 RX ADMIN — Medication SCH MG: at 08:37

## 2022-10-23 RX ADMIN — OXYCODONE HYDROCHLORIDE AND ACETAMINOPHEN SCH MG: 500 TABLET ORAL at 08:37

## 2022-10-23 RX ADMIN — MAGNESIUM SULFATE IN DEXTROSE SCH MLS/HR: 10 INJECTION, SOLUTION INTRAVENOUS at 08:36

## 2022-10-23 RX ADMIN — FAMOTIDINE SCH MG: 20 TABLET, FILM COATED ORAL at 20:28

## 2022-10-23 RX ADMIN — PSYLLIUM HUSK PRN GM: 3.4 POWDER ORAL at 08:36

## 2022-10-23 RX ADMIN — CLOPIDOGREL BISULFATE SCH MG: 75 TABLET ORAL at 08:37

## 2022-10-23 RX ADMIN — SODIUM CHLORIDE TAB 1 GM SCH GM: 1 TAB at 08:37

## 2022-10-23 RX ADMIN — HEPARIN SODIUM SCH: 5000 INJECTION INTRAVENOUS; SUBCUTANEOUS at 17:07

## 2022-10-23 RX ADMIN — SODIUM CHLORIDE SCH: 3 INJECTION, SOLUTION INTRAVENOUS at 17:39

## 2022-10-23 RX ADMIN — SODIUM CHLORIDE TAB 1 GM SCH GM: 1 TAB at 20:28

## 2022-10-23 RX ADMIN — FUROSEMIDE SCH MG: 10 INJECTION, SOLUTION INTRAMUSCULAR; INTRAVENOUS at 10:40

## 2022-10-23 RX ADMIN — FAMOTIDINE SCH MG: 20 TABLET, FILM COATED ORAL at 08:36

## 2022-10-23 RX ADMIN — PSYLLIUM HUSK PRN GM: 3.4 POWDER ORAL at 01:48

## 2022-10-23 RX ADMIN — FUROSEMIDE SCH MG: 10 INJECTION, SOLUTION INTRAMUSCULAR; INTRAVENOUS at 08:36

## 2022-10-23 RX ADMIN — HEPARIN SODIUM SCH: 5000 INJECTION INTRAVENOUS; SUBCUTANEOUS at 08:37

## 2022-10-23 RX ADMIN — FUROSEMIDE SCH MG: 10 INJECTION, SOLUTION INTRAMUSCULAR; INTRAVENOUS at 20:29

## 2022-10-23 NOTE — P.PN
Subjective





Patient is seen in follow-up for hyponatremia.  Patient is sitting up in chair. 

She is awake but confused.  Sodium level improved.  Receiving IV Lasix.  

Nonoliguric.  Received 3% overnight but currently off IV fluids.  Blood pressure

on the lower side this morning.





Vital signs are stable.


General: Awake.


HEENT: Bruising noted.  On nasal cannula.


LUNGS: Breath sounds decreased.


HEART: Rate and Rhythm are regular.


ABDOMEN: Soft, no distention.


EXTREMITITES: Lower extremities wrapped.  Generalized bruising noted.





Objective





- Vital Signs


Vital signs: 


                                   Vital Signs











Temp  97.3 F L  10/23/22 09:00


 


Pulse  92   10/23/22 09:00


 


Resp  10 L  10/23/22 09:00


 


BP  91/60   10/23/22 09:00


 


Pulse Ox  91 L  10/23/22 09:00


 


FiO2      








                                 Intake & Output











 10/22/22 10/23/22 10/23/22





 18:59 06:59 18:59


 


Intake Total 1000  600


 


Output Total 1155 1915 75


 


Balance -155 -1915 525


 


Intake:   


 


  Intake, IV Titration   100





  Amount   


 


    Magnesium Sulfate-D5w Pmx   100





    1 gm In Dextrose/Water 1   





    100ml.bag @ 100 mls/hr   





    IVPB Q1H [facility] Rx#:   





    [id number]   


 


  Oral 1000  500


 


Output:   


 


  Urine 1155 1915 75


 


Other:   


 


  Voiding Method Indwelling Catheter Indwelling Catheter 














- Labs


CBC & Chem 7: 


                                 10/23/22 06:20





                                 10/23/22 06:20


Labs: 


                  Abnormal Lab Results - Last 24 Hours (Table)











  10/22/22 10/22/22 10/22/22 Range/Units





  12:10 16:28 19:47 


 


RBC     (3.80-5.40)  m/uL


 


Hgb     (11.4-16.0)  gm/dL


 


Hct     (34.0-46.0)  %


 


Lymphocytes #     (1.0-4.8)  k/uL


 


Sodium  119 L*   118 L*  (137-145)  mmol/L


 


Chloride     ()  mmol/L


 


Glucose     (74-99)  mg/dL


 


POC Glucose (mg/dL)   127 H   ()  mg/dL


 


Calcium     (8.4-10.2)  mg/dL


 


Magnesium     (1.6-2.3)  mg/dL


 


Total Protein     (6.3-8.2)  g/dL


 


Albumin     (3.5-5.0)  g/dL


 


Vitamin B12  1414.0 H    (200.0-944.0)  pg/mL














  10/23/22 10/23/22 10/23/22 Range/Units





  00:11 06:20 06:20 


 


RBC    3.29 L  (3.80-5.40)  m/uL


 


Hgb    11.2 L  (11.4-16.0)  gm/dL


 


Hct    31.1 L  (34.0-46.0)  %


 


Lymphocytes #    0.3 L  (1.0-4.8)  k/uL


 


Sodium  122 L  125 L   (137-145)  mmol/L


 


Chloride   91 L   ()  mmol/L


 


Glucose   107 H   (74-99)  mg/dL


 


POC Glucose (mg/dL)     ()  mg/dL


 


Calcium   8.1 L   (8.4-10.2)  mg/dL


 


Magnesium   1.4 L   (1.6-2.3)  mg/dL


 


Total Protein   4.7 L   (6.3-8.2)  g/dL


 


Albumin   2.8 L   (3.5-5.0)  g/dL


 


Vitamin B12     (200.0-944.0)  pg/mL














  10/23/22 Range/Units





  06:44 


 


RBC   (3.80-5.40)  m/uL


 


Hgb   (11.4-16.0)  gm/dL


 


Hct   (34.0-46.0)  %


 


Lymphocytes #   (1.0-4.8)  k/uL


 


Sodium   (137-145)  mmol/L


 


Chloride   ()  mmol/L


 


Glucose   (74-99)  mg/dL


 


POC Glucose (mg/dL)  115 H  ()  mg/dL


 


Calcium   (8.4-10.2)  mg/dL


 


Magnesium   (1.6-2.3)  mg/dL


 


Total Protein   (6.3-8.2)  g/dL


 


Albumin   (3.5-5.0)  g/dL


 


Vitamin B12   (200.0-944.0)  pg/mL








                      Microbiology - Last 24 Hours (Table)











 10/19/22 19:08 Blood Culture - Preliminary





 Blood    No Growth after 72 hours














Assessment and Plan


Plan: 





Assessment:


1.  Hyponatremia.  Slightly hypervolemic.  Also component of poor intake and 

SIADH from pain and infection.  Urine sodium 77 and urine osmolality 477.  TSH 

and cortisol normal.  Status post 3% saline last night.  Sodium level 125 today.

 Patient also admits to drinking at least 64 ounces of water daily at home.


2.  Status post fall.


3.  COVID-19 infection.


4.  Benign hypertension.  Blood pressure in the lower side this morning.


5.  Hypomagnesemia from poor intake.





Plan:


Maintain 1200 mL fluid resection.


Maintain sodium chloride tabs.


Maintain IV Lasix.  Hold for systolic blood pressure less than 100.


Encouraged oral intake, particularly protein.


Repeat sodium level this evening.


Replace magnesium.


Hold antihypertensives for systolic blood pressure less than 120.

## 2022-10-23 NOTE — P.CONS
History of Present Illness





- Reason for Consult


Consult date: 10/22/22





- History of Present Illness


Patient is a 82-year-old  female with a past medical history 

significant for coronary artery disease diabetes mellitus hypertension patient 

apparently was found by her neighbor in her garage on the ground attempting to 

crawl and she had breath closing on her multiple areas of bruising throughout 

her body and superficial abrasion patient was brought into the ER 3 days ago on 

10/19/2022 patient was noticed to have low sodium 113 CT of the head came back 

negative patient did have a COVID-19 vaccine and has been posted however did 

have a positive COVID testing patient is currently in the ICU because of her low

sodium on arrival to the ER the patient was hypothermic since then the patient 

has a normal temperature patient is currently 97% on room air patient did have a

normal white count mild lymphopenia creatinine has been normal liver enzymes are

normal urine has been negative blood cultures obtained which are currently 

negative patient did have a chest x-ray cardiomegaly mild pulm vascular 

congestion correlate with BNP for congestive heart failure patient did have 

multiple bruises to upper and lower extremity infectious he was consulted for 

possible wound care patient is very poor historian sleepy lethargic and did not 

answer any question most of the information has been obtained from review the 

chart and talking with nursing staff








Past Medical History


History of Any Multi-Drug Resistant Organisms: None Reported


Smoking Status: Never smoker


Past Alcohol Use History: None Reported


Past Drug Use History: None Reported





Medications and Allergies


                                Home Medications











 Medication  Instructions  Recorded  Confirmed  Type


 


Atorvastatin [Lipitor] 20 mg PO HS 10/19/22 10/19/22 History


 


Clopidogrel [Plavix] 75 mg PO DAILY 10/19/22 10/19/22 History


 


Furosemide [Lasix] 20 mg PO DAILY 10/19/22 10/19/22 History


 


Spironolactone [Aldactone] 25 mg PO DAILY 10/19/22 10/19/22 History


 


carvediloL [Coreg] 6.25 mg PO BID-W/MEALS 10/19/22 10/19/22 History


 


lisinopriL [Zestril] 10 mg PO DAILY 10/19/22 10/19/22 History


 


metFORMIN HCL [Glucophage] 500 mg PO BID-W/MEALS 10/19/22 10/19/22 History








                                    Allergies











Allergy/AdvReac Type Severity Reaction Status Date / Time


 


No Known Allergies Allergy   Unverified 10/19/22 21:05














Physical Exam


Vitals: 


                                   Vital Signs











  Temp Pulse Resp BP Pulse Ox


 


 10/22/22 11:00   64  19  122/67  99


 


 10/22/22 10:00   74  13  130/68  100


 


 10/22/22 09:00   78  18  132/58  95


 


 10/22/22 08:00  98.0 F  78  18  139/65  94 L


 


 10/22/22 07:00   82  22  138/71  100


 


 10/22/22 06:00   70  14  122/60  100


 


 10/22/22 05:00   79  15  125/61  96


 


 10/22/22 04:00  97.6 F  75  15  122/74  100


 


 10/22/22 03:00   77  16  97/59  100


 


 10/22/22 02:00   69  15  125/63  99


 


 10/22/22 01:00   80  17  118/58  96


 


 10/22/22 00:00  97.7 F  76  14  144/64  95


 


 10/21/22 23:00   84  16  123/76  95


 


 10/21/22 22:00   84  13  130/62  98


 


 10/21/22 21:00   75  20  126/57  96


 


 10/21/22 20:00  98.1 F  76  13  121/55  95


 


 10/21/22 19:00   76  13  137/68  96


 


 10/21/22 18:00   84  14  137/83  97


 


 10/21/22 17:00   84  13  134/66  96


 


 10/21/22 16:00   82  12  124/57  100


 


 10/21/22 15:00   78  8 L  127/68  96


 


 10/21/22 14:00   74  15  125/57  97


 


 10/21/22 13:00     127/59  95








                                Intake and Output











 10/21/22 10/22/22 10/22/22





 22:59 06:59 14:59


 


Intake Total 680  600


 


Output Total 280 1385 1005


 


Balance 400 -1385 -405


 


Intake:   


 


  Oral 680  600


 


Output:   


 


  Urine 280 1385 1005


 


Other:   


 


  Voiding Method Indwelling Catheter Indwelling Catheter Indwelling Catheter


 


  Weight  67.5 kg 














Results


CBC & Chem 7: 


                                 10/23/22 06:20





                                 10/23/22 06:20


Labs: 


                  Abnormal Lab Results - Last 24 Hours (Table)











  10/21/22 10/21/22 10/21/22 Range/Units





  12:19 16:35 18:18 


 


RBC     (3.80-5.40)  m/uL


 


Hgb     (11.4-16.0)  gm/dL


 


Hct     (34.0-46.0)  %


 


Lymphocytes #     (1.0-4.8)  k/uL


 


Sodium  119 L*   119 L*  (137-145)  mmol/L


 


Chloride     ()  mmol/L


 


Creatinine     (0.52-1.04)  mg/dL


 


POC Glucose (mg/dL)   124 H   ()  mg/dL


 


Calcium     (8.4-10.2)  mg/dL


 


AST     (14-36)  U/L


 


ALT     (4-34)  U/L


 


Total Protein     (6.3-8.2)  g/dL


 


Albumin     (3.5-5.0)  g/dL














  10/21/22 10/21/22 10/22/22 Range/Units





  20:21 21:38 05:43 


 


RBC    3.24 L  (3.80-5.40)  m/uL


 


Hgb    11.0 L  (11.4-16.0)  gm/dL


 


Hct    30.8 L  (34.0-46.0)  %


 


Lymphocytes #    0.3 L  (1.0-4.8)  k/uL


 


Sodium   119 L*   (137-145)  mmol/L


 


Chloride     ()  mmol/L


 


Creatinine     (0.52-1.04)  mg/dL


 


POC Glucose (mg/dL)  139 H    ()  mg/dL


 


Calcium     (8.4-10.2)  mg/dL


 


AST     (14-36)  U/L


 


ALT     (4-34)  U/L


 


Total Protein     (6.3-8.2)  g/dL


 


Albumin     (3.5-5.0)  g/dL














  10/22/22 10/22/22 10/22/22 Range/Units





  05:43 06:23 12:10 


 


RBC     (3.80-5.40)  m/uL


 


Hgb     (11.4-16.0)  gm/dL


 


Hct     (34.0-46.0)  %


 


Lymphocytes #     (1.0-4.8)  k/uL


 


Sodium  121 L   119 L*  (137-145)  mmol/L


 


Chloride  88 L    ()  mmol/L


 


Creatinine  0.45 L    (0.52-1.04)  mg/dL


 


POC Glucose (mg/dL)   63 L   ()  mg/dL


 


Calcium  7.7 L    (8.4-10.2)  mg/dL


 


AST  45 H    (14-36)  U/L


 


ALT  37 H    (4-34)  U/L


 


Total Protein  4.5 L    (6.3-8.2)  g/dL


 


Albumin  2.7 L    (3.5-5.0)  g/dL








                      Microbiology - Last 24 Hours (Table)











 10/19/22 19:08 Blood Culture - Preliminary





 Blood    No Growth after 48 hours














Assessment and Plan


Plan: 


1patient who did have multiple bruises to her bilateral lower extremity to the 

facial area currently do not have any open wound that I was able to appreciate 

and no evidence of any cellulitis treatment is mostly supportive marking the 

area of the bruising to make sure they are not getting any bigger but no need 

for any specific lotion or treatment.


2patient with a positive COVID test did not have significant respiratory 

symptoms patient is currently not hypoxic no need for supplemental oxygen chest 

x-ray mostly CHF treatment is mostly supportive.


3patient to continue with the heparin vitamin C and zinc no need for steroids 

or remdesivir at this point.


We will follow on clinical condition and cultures to further adjust medication 

if needed


Thank you for this consultation will follow this patient along with you

## 2022-10-23 NOTE — PN
PROGRESS NOTE







SUBJECTIVE:

This 82-year-old woman, who was admitted with fall and severe hyponatremia, is 
being

closely monitored in the ICU.  The patient had multiple  of hypertonic saline.

Multiple consultants are following the patient closely.  The patient has 
multiple

wounds.  Also, no chest pain.



PAST MEDICAL HISTORY:

Reviewed.



REVIEW OF SYSTEMS:

A 14-point review of systems is negative except as mentioned earlier.



CURRENT MEDICATIONS:

Include vitamin C, doses and rest of the medications noted.



PHYSICAL EXAMINATION:

VITAL SIGNS:  respirations 14.

HEENT:  Conjunctivae normal.  

RESPIRATIONS:  Breath sounds diminished in the bases.  No rhonchi.

ABDOMEN:  Soft.

NERVOUS SYSTEM:  Mild diffuse weakness 



LABORATORY DATA:

Noted.  Sodium improved to  at this time.



ASSESSMENT:

1. Severe hyponatremia, possibly syndrome of inappropriate secretion of 
antidiuretic

    hormone.

2. Falls.

3. Multiple cutaneous ulcers.

4. Change in mental status, metabolic encephalopathy, multifactorial, acute 
COVID-19

    infection.

5. Diabetes mellitus, type 2.

6. History of coronary artery disease.

7. Multiple medical issues.



RECOMMENDATIONS:

I recommend to continue .  Otherwise, I would closely follow with Nephrology

regarding the hyponatremia.  Repeat labs in the morning. prognosis because of

multiple complex medical issues.  See orders for further details.  Further

recommendations to follow.  Infectious Disease evaluation.





MMODL / IJN: [id number] / Job#: [id number]

MTDD

## 2022-10-23 NOTE — P.PN
Subjective


Progress Note Date: 10/23/22


Principal diagnosis: 





Acute hypovolemic hyponatremia and COVID-19 infection





This is an 82-year-old female with history of hypertension, coronary artery 

disease, diabetes, poor historian, patient presented to the ER yesterday shortly

after she was found by her neighbor in her garage, on the ground, attempting to 

crawl, and she had wet clothing and multiple areas of bruises throughout her 

whole body, and superficial abrasions.  Apparently the patient was very 

confused, she was hypothermic when she arrived to the ER with a temp of 84.  

Patient was also noted to have low sodium of 113.  Patient is normally on 

diuretics including Lasix 20 mg daily and on Aldactone 25 mg daily, she is also 

on multiple cardiac meds including Lipitor, Plavix, Coreg, and Zestril.  At any 

rate it is not clear how long with the patient down in the garage, however 

considering her altered mental status she had extensive scanning of her brain, 

and all came back nondiagnostic.  Patient also had a CT angiogram of the head, 

came back unremarkable.  Patient was admitted to the ICU mostly because of her 

hypothermia and her hyponatremia.  I felt that her hyponatremia is hypovolemic 

in nature, patient had low sodium of 113, and she had a relatively elevated 

urine osmolality.  Initially the patient was given 3% saline, however as she was

noted to have relatively low blood pressure, we recommended stopping the 3% 

saline and she was given a liter of lactated Ringer's.  Then her lactated 

Ringer's was changed to 75 mL per hour and follow up sodium this morning is 116.

 Patient will remain on lactated Ringer's at 50 mL per hour, and we will 

continue to monitor her sodium, no more 3% saline was given.  In the meantime 

patient was tested for COVID-19 infection, and sure enough she tested positive. 

Supposedly the patient is vaccinated and boosted.  Patient had no symptoms of 

shortness of breath, she had no pulmonary symptoms whatsoever, and her chest x-

ray is basically relatively unremarkable.  Hence this was an incidental COVID-19

positive finding





Patient was reevaluated today on 10/21/22, remains in the ICU, sodium remains 

low, today is 117, hence the patient was seen by nephrology and she is back on 

3% saline.  She is also on oral sodium tablets.  Clinically the patient is 

feeling better, she is not in any distress, she continues to hold relatively low

temperature, today I'm recommending thyroid profile and a serum cortisol level. 

Her cortisol level came back normal/12 and her thyroid profile is pending her 

WBC count is 4.1 hemoglobin is 9.5, electrolytes are normal except for low 

sodium of 117 renal profile is normal





Reevaluated today on 10/22/22, patient is gradually improving, her sodium is up 

to 121, this is being addressed by nephrology on the case.  Pulmonary-wise the 

patient is not in any distress, her mentation is significantly improved.  And I 

believe the COVID-19 infection is rather incidental.  Patient does not have any 

symptoms to suggest COVID-19 pneumonia.  Hence I'm planning to transfer the 

patient out of the ICU to a regular medical floor today.  Labs from today were 

all reviewed renal profile is normal BUN is 12 creatinine 0.45, bicarb is 27 and

sodium is 121





Reevaluated today on 10/23/22, patient is doing well, received 3% saline 

overnight, and now her sodium is 125.  Overall the patient is doing great, she 

is alert oriented 3, does not seem to be confused one bit.  Patient is now on 

overflow, she could be transferred to a regular medical floor once a bed is 

available.  Supposedly the patient has been on diuretics for LV dysfunction, I 

have not seen an echocardiogram on this patient after reviewing previous 

records, hence I'm recommending an echocardiogram on this patient to assess LV 

function.  Patient has been receiving diuretics by nephrology, and her sodium 

has been fluctuating all over the place.  At any rate I believe it is safe 

enough to transfer the patient out of the ICU once a bed is available.  

Pulmonary-wise remains on room air, she is not in any distress.  Blood pressure 

is marginal at her blood pressure medication is presently on hold.  Even her 

Lasix was put on hold earlier today











Objective





- Vital Signs


Vital signs: 


                                   Vital Signs











Temp  97.3 F L  10/23/22 09:00


 


Pulse  85   10/23/22 12:00


 


Resp  17   10/23/22 12:00


 


BP  126/67   10/23/22 12:00


 


Pulse Ox  98   10/23/22 12:00


 


FiO2      








                                 Intake & Output











 10/22/22 10/23/22 10/23/22





 18:59 06:59 18:59


 


Intake Total 1000  600


 


Output Total 1155 1915 325


 


Balance -155 -1915 275


 


Intake:   


 


  Intake, IV Titration   100





  Amount   


 


    Magnesium Sulfate-D5w Pmx   100





    1 gm In Dextrose/Water 1   





    100ml.bag @ 100 mls/hr   





    IVPB Q1H [facility] Rx#:   





    [id number]   


 


  Oral 1000  500


 


Output:   


 


  Urine 1155 1915 325


 


Other:   


 


  Voiding Method Indwelling Catheter Indwelling Catheter Indwelling Catheter














- Exam





Physical Exam: Revealed an 82-year-old female in no distress, on room air.


Head: normocephalic.  Multiple abrasions noted on her face.  And throughout her 

whole body


HEENT:[Neck is supple.] [No neck masses.] [No thyromegaly.] [No JVD.]


Chest: [Clear throughout, no crackles, no rhonchi, no wheezes.]


Cardiac Exam: Irregular irregular rhythm.  [Normal S1 and S2, no S3 gallop, no 

murmur.]


Abdomen: [Soft, nontender,  no megaly, no rebound, no guarding, normal bowel 

sounds.]


Extremities: [No clubbing, no edema, no cyanosis.]


Neurological Exam: Alert oriented 3, no gross focal deficits.


Psychiatric: Less anxious today, normal affect, normal mental status


Skin: Multiple bruises and abrasions noted throughout her whole body.











- Labs


CBC & Chem 7: 


                                 10/23/22 06:20





                                 10/23/22 06:20


Labs: 


                  Abnormal Lab Results - Last 24 Hours (Table)











  10/22/22 10/22/22 10/22/22 Range/Units





  12:10 16:28 19:47 


 


RBC     (3.80-5.40)  m/uL


 


Hgb     (11.4-16.0)  gm/dL


 


Hct     (34.0-46.0)  %


 


Lymphocytes #     (1.0-4.8)  k/uL


 


Sodium  119 L*   118 L*  (137-145)  mmol/L


 


Chloride     ()  mmol/L


 


Glucose     (74-99)  mg/dL


 


POC Glucose (mg/dL)   127 H   ()  mg/dL


 


Calcium     (8.4-10.2)  mg/dL


 


Magnesium     (1.6-2.3)  mg/dL


 


Total Protein     (6.3-8.2)  g/dL


 


Albumin     (3.5-5.0)  g/dL


 


Vitamin B12  1414.0 H    (200.0-944.0)  pg/mL














  10/23/22 10/23/22 10/23/22 Range/Units





  00:11 06:20 06:20 


 


RBC    3.29 L  (3.80-5.40)  m/uL


 


Hgb    11.2 L  (11.4-16.0)  gm/dL


 


Hct    31.1 L  (34.0-46.0)  %


 


Lymphocytes #    0.3 L  (1.0-4.8)  k/uL


 


Sodium  122 L  125 L   (137-145)  mmol/L


 


Chloride   91 L   ()  mmol/L


 


Glucose   107 H   (74-99)  mg/dL


 


POC Glucose (mg/dL)     ()  mg/dL


 


Calcium   8.1 L   (8.4-10.2)  mg/dL


 


Magnesium   1.4 L   (1.6-2.3)  mg/dL


 


Total Protein   4.7 L   (6.3-8.2)  g/dL


 


Albumin   2.8 L   (3.5-5.0)  g/dL


 


Vitamin B12     (200.0-944.0)  pg/mL














  10/23/22 10/23/22 Range/Units





  06:44 12:28 


 


RBC    (3.80-5.40)  m/uL


 


Hgb    (11.4-16.0)  gm/dL


 


Hct    (34.0-46.0)  %


 


Lymphocytes #    (1.0-4.8)  k/uL


 


Sodium    (137-145)  mmol/L


 


Chloride    ()  mmol/L


 


Glucose    (74-99)  mg/dL


 


POC Glucose (mg/dL)  115 H  116 H  ()  mg/dL


 


Calcium    (8.4-10.2)  mg/dL


 


Magnesium    (1.6-2.3)  mg/dL


 


Total Protein    (6.3-8.2)  g/dL


 


Albumin    (3.5-5.0)  g/dL


 


Vitamin B12    (200.0-944.0)  pg/mL








                      Microbiology - Last 24 Hours (Table)











 10/19/22 19:08 Blood Culture - Preliminary





 Blood    No Growth after 72 hours














Assessment and Plan


Assessment: 





Impression:


Hypovolemic hyponatremia


Altered mental status consistent with acute metabolic encephalopathy, resolved


COVID-19 infection, however she has no active pulmonary symptoms.


History of benign essential hypertension


History of congestive heart failure, maintained on diuretics.


History of type 2 diabetes.


Coronary arteriosclerosis.


Dyslipidemia.


Multiple abrasions and bruises secondary to fall





Recommendation:


Transfer patient out of the ICU to a regular medical floor


Continue Pepcid for the GI prophylaxis


Patient may benefit from cautious hydration specially with her history of LV 

dysfunction and I would recommend to continue holding diuretics, assess LV 

function, check echocardiogram/Doppler


Continue Xanax


Continue COVID-19 cocktail


We will continue to follow


Time with Patient: Less than 30

## 2022-10-24 LAB
ANION GAP SERPL CALC-SCNC: 7 MMOL/L
BASOPHILS # BLD AUTO: 0 K/UL (ref 0–0.2)
BASOPHILS NFR BLD AUTO: 0 %
BUN SERPL-SCNC: 14 MG/DL (ref 7–17)
CALCIUM SPEC-MCNC: 8.4 MG/DL (ref 8.4–10.2)
CHLORIDE SERPL-SCNC: 93 MMOL/L (ref 98–107)
CO2 SERPL-SCNC: 28 MMOL/L (ref 22–30)
EOSINOPHIL # BLD AUTO: 0 K/UL (ref 0–0.7)
EOSINOPHIL NFR BLD AUTO: 1 %
ERYTHROCYTE [DISTWIDTH] IN BLOOD BY AUTOMATED COUNT: 3.26 M/UL (ref 3.8–5.4)
ERYTHROCYTE [DISTWIDTH] IN BLOOD: 12.7 % (ref 11.5–15.5)
GLUCOSE BLD-MCNC: 101 MG/DL (ref 70–110)
GLUCOSE BLD-MCNC: 112 MG/DL (ref 70–110)
GLUCOSE BLD-MCNC: 124 MG/DL (ref 70–110)
GLUCOSE BLD-MCNC: 162 MG/DL (ref 70–110)
GLUCOSE SERPL-MCNC: 114 MG/DL (ref 74–99)
HCT VFR BLD AUTO: 31.4 % (ref 34–46)
HGB BLD-MCNC: 10.8 GM/DL (ref 11.4–16)
LYMPHOCYTES # SPEC AUTO: 0.3 K/UL (ref 1–4.8)
LYMPHOCYTES NFR SPEC AUTO: 5 %
MCH RBC QN AUTO: 33 PG (ref 25–35)
MCHC RBC AUTO-ENTMCNC: 34.3 G/DL (ref 31–37)
MCV RBC AUTO: 96.3 FL (ref 80–100)
MONOCYTES # BLD AUTO: 0.4 K/UL (ref 0–1)
MONOCYTES NFR BLD AUTO: 7 %
NEUTROPHILS # BLD AUTO: 5.1 K/UL (ref 1.3–7.7)
NEUTROPHILS NFR BLD AUTO: 84 %
PLATELET # BLD AUTO: 367 K/UL (ref 150–450)
POTASSIUM SERPL-SCNC: 3.9 MMOL/L (ref 3.5–5.1)
SODIUM SERPL-SCNC: 128 MMOL/L (ref 137–145)
WBC # BLD AUTO: 6 K/UL (ref 3.8–10.6)

## 2022-10-24 RX ADMIN — HEPARIN SODIUM SCH: 5000 INJECTION INTRAVENOUS; SUBCUTANEOUS at 00:03

## 2022-10-24 RX ADMIN — FAMOTIDINE SCH MG: 20 TABLET, FILM COATED ORAL at 21:35

## 2022-10-24 RX ADMIN — FAMOTIDINE SCH MG: 20 TABLET, FILM COATED ORAL at 08:29

## 2022-10-24 RX ADMIN — OXYCODONE HYDROCHLORIDE AND ACETAMINOPHEN SCH MG: 500 TABLET ORAL at 08:29

## 2022-10-24 RX ADMIN — CLOPIDOGREL BISULFATE SCH MG: 75 TABLET ORAL at 08:29

## 2022-10-24 RX ADMIN — Medication SCH MG: at 08:29

## 2022-10-24 RX ADMIN — Medication SCH MCG: at 08:29

## 2022-10-24 RX ADMIN — SODIUM CHLORIDE TAB 1 GM SCH GM: 1 TAB at 21:35

## 2022-10-24 RX ADMIN — HEPARIN SODIUM SCH: 5000 INJECTION INTRAVENOUS; SUBCUTANEOUS at 08:30

## 2022-10-24 RX ADMIN — SODIUM CHLORIDE TAB 1 GM SCH GM: 1 TAB at 08:29

## 2022-10-24 RX ADMIN — ATORVASTATIN CALCIUM SCH MG: 20 TABLET, FILM COATED ORAL at 21:35

## 2022-10-24 RX ADMIN — ACETAMINOPHEN PRN MG: 500 TABLET ORAL at 21:47

## 2022-10-24 RX ADMIN — FUROSEMIDE SCH MG: 10 INJECTION, SOLUTION INTRAMUSCULAR; INTRAVENOUS at 08:29

## 2022-10-24 RX ADMIN — FOLIC ACID SCH MG: 1 TABLET ORAL at 08:29

## 2022-10-24 NOTE — CA
Transthoracic Echo Report 

 Name: Constanza Paulino 

 MRN:    [id number] 

 Age:    82     Gender:     F 

 

 :    1940 

 Exam Date:     10/24/2022 08:02 

 Exam Location: [facility] 

 Ht (in):     66     Wt (lb):     133 

 Ordering Physician:        Filomena Crow MD 

 Attending/Referring Phys: 

 Technician         Yuliya Haas RDCS 

 Procedure CPT: 

 Indications:       cadiomyopathy 

 

 Cardiac Hx: 

 Technical Quality:      Fair 

 Contrast 1:                                Total Dose (mL): 

 Contrast 2:                                Total Dose (mL): 

 

 MEASUREMENTS  (Male / Female) Normal Values 

 2D ECHO 

 LV Diastolic Diameter PLAX        4.7 cm                4.2 - 5.9 / 3.9 - 5.3 cm 

 LV Systolic Diameter PLAX         4.2 cm                 

 IVS Diastolic Thickness           1.2 cm                0.6 - 1.0 / 0.6 - 0.9 cm 

 LVPW Diastolic Thickness          1.4 cm                0.6 - 1.0 / 0.6 - 0.9 cm 

 LV Relative Wall Thickness        0.6                    

 RV Internal Dim ED PLAX           3.2 cm                 

 LA Systolic Diameter LX           3.7 cm                3.0 - 4.0 / 2.7 - 3.8 cm 

 LA Volume                         72.1 cm???              18 - 58 / 22 - 52 cm??? 

 

 M-MODE 

 Aortic Root Diameter MM           3.6 cm                 

 MV E Point Septal Separation      0.6 cm                 

 AV Cusp Separation MM             2.2 cm                 

 

 DOPPLER 

 AV Peak Velocity                  153.8 cm/s             

 AV Peak Gradient                  9.5 mmHg               

 AI Peak Velocity                  365.9 cm/s             

 AI Peak Gradient                  53.6 mmHg              

 AI Pressure Half Time             388.2 ms               

 MV Area PHT                       8.6 cm???                

 Mitral E Point Velocity           138.5 cm/s             

 Mitral A Point Velocity           66.4 cm/s              

 Mitral E to A Ratio               2.1                    

 MV Deceleration Time              87.9 ms                

 TR Peak Velocity                  301.0 cm/s             

 TR Peak Gradient                  36.2 mmHg              

 Right Ventricular Systolic Press  41.2 mmHg              

 

 

 FINDINGS 

 Left Ventricle 

 Left ventricular ejection fraction is estimated at 15-20 %. Left ventricular  

 cavity size normal.  Mildly increased septal wall thickness. Moderately  

 increased posterior wall thickness. Severely reduced global left ventricular  

 systolic function. 

 

 Right Ventricle 

 Normal right ventricular size and function. Mild pulmonary hypertension. 

 

 Right Atrium 

 Normal right atrial size. 

 

 Left Atrium 

 Moderately increased left atrial volume. Mildly increased left atrial area. 

 

 Mitral Valve 

 Mitral annular calcification. Trace to mild mitral regurgitation. 

 

 Aortic Valve 

 Focal thickening of the aortic valve cusps. Mild aortic regurgitation. 

 

 Tricuspid Valve 

 Mild tricuspid regurgitation. 

 

 Pulmonic Valve 

 Pulmonic valve not well visualized. 

 

 Pericardium 

 Normal pericardium. No pericardial or pleural effusion. 

 

 Aorta 

 Normal size aortic root and proximal ascending aorta. 

 

 CONCLUSIONS 

 Severe LV dysfunction 

 Increased LV mass 

 Previewed by:  

 Dr. Gus George MD 

 (Electronically Signed) 

 Final Date:      2022 12:52

## 2022-10-24 NOTE — PN
PROGRESS NOTE



DATE OF SERVICE:  10/23/2022



HISTORY OF PRESENT ILLNESS:

This is an 82-year-old woman who was admitted with hypovolemic hyponatremia, 
acute

COVID-19 infection, is being closely monitored in the ICU at this time.  The 
patient

received hypertonic saline on multiple occasions.  The patient also has history 
of fall

also, Dr. Prescott is following the patient closely.  Symptomatic treatment is 
being

given.   is following the patient closely.



PAST MEDICAL HISTORY:

Reviewed.



REVIEW OF SYSTEMS:

14-point review is negative as mentioned earlier.



CURRENT MEDICATIONS:

Reviewed include Xanax and rest of the medications and doses are noted.



PHYSICAL EXAMINATION:

VITAL SIGNS:  Pulse is 86, blood pressure is 142/73, respirations 21.

HEENT:  Conjunctivae normal.

NECK:  No JVD.

CARDIOVASCULAR:  S1, S2.

RESPIRATIONS:  Few scattered rhonchi.

ABDOMEN:  Soft and nontender.





LABORATORY DATA:

Reviewed.



ASSESSMENT:

1. Acute hypovolemic hyponatremia.

2. Acute COVID-19 infection.

3. Change in mental status, metabolic encephalopathy.

4. Hypertension.

5. Multiple complex medical issues.



RECOMMENDATIONS:

Recommend to continue current management and recommend repeat labs tomorrow.  
Continue

the rest of medications and prognosis is guarded.  Dr. Prescott will follow 
tomorrow.





MMODL / IJN: [id number] / Job#: [id number]

[facility]

## 2022-10-24 NOTE — XR
EXAMINATION TYPE: XR chest 1V

 

DATE OF EXAM: 10/24/2022

 

COMPARISON: Chest x-ray 10/19/2022

 

HISTORY: Shortness of breath

 

TECHNIQUE: Single frontal view of the chest is obtained.

 

FINDINGS: Patient is rotated. There is no focal air space opacity, pleural effusion, or pneumothorax 
seen.  The cardiac silhouette size is within normal limits.  Aorta is dense. The osseous structures a
re stable, marked arthropathy again noted within the shoulders, there are overlying leads.

 

IMPRESSION:  No acute process.

## 2022-10-24 NOTE — P.PN
Subjective





Patient is seen in follow-up for hyponatremia.  Patient is sitting up in chair. 

Undergoing echocardiogram.  Sodium level stable at 128.  Receiving IV Lasix.  

Nonoliguric.  Hemodynamically stable.





Vital signs are stable.


General: Awake.


HEENT: Bruising noted. 


LUNGS: Breath sounds decreased.


HEART: Rate and Rhythm are regular.


ABDOMEN: Soft, no distention.


EXTREMITITES: Generalized bruising noted.  Trace edema.





Objective





- Vital Signs


Vital signs: 


                                   Vital Signs











Temp  97.5 F L  10/24/22 08:00


 


Pulse  84   10/24/22 08:00


 


Resp  17   10/24/22 08:00


 


BP  113/54   10/24/22 08:00


 


Pulse Ox  96   10/24/22 08:00


 


FiO2      








                                 Intake & Output











 10/23/22 10/24/22 10/24/22





 18:59 06:59 18:59


 


Intake Total 800  


 


Output Total 1825 725 


 


Balance -1025 -725 


 


Weight  60.5 kg 


 


Intake:   


 


  Intake, IV Titration 100  





  Amount   


 


    Magnesium Sulfate-D5w Pmx 100  





    1 gm In Dextrose/Water 1   





    100ml.bag @ 100 mls/hr   





    IVPB Q1H [facility] Rx#:   





    [id number]   


 


  Oral 700  


 


Output:   


 


  Urine 1825 725 


 


Other:   


 


  Voiding Method Indwelling Catheter Indwelling Catheter Indwelling Catheter














- Labs


CBC & Chem 7: 


                                 10/24/22 06:43





                                 10/24/22 06:43


Labs: 


                  Abnormal Lab Results - Last 24 Hours (Table)











  10/23/22 10/23/22 10/23/22 Range/Units





  12:28 15:46 19:51 


 


RBC     (3.80-5.40)  m/uL


 


Hgb     (11.4-16.0)  gm/dL


 


Hct     (34.0-46.0)  %


 


Lymphocytes #     (1.0-4.8)  k/uL


 


Sodium   128 L   (137-145)  mmol/L


 


Chloride     ()  mmol/L


 


Creatinine     (0.52-1.04)  mg/dL


 


Glucose     (74-99)  mg/dL


 


POC Glucose (mg/dL)  116 H   168 H  ()  mg/dL














  10/24/22 10/24/22 10/24/22 Range/Units





  06:12 06:43 06:43 


 


RBC   3.26 L   (3.80-5.40)  m/uL


 


Hgb   10.8 L   (11.4-16.0)  gm/dL


 


Hct   31.4 L   (34.0-46.0)  %


 


Lymphocytes #   0.3 L   (1.0-4.8)  k/uL


 


Sodium    128 L  (137-145)  mmol/L


 


Chloride    93 L  ()  mmol/L


 


Creatinine    0.46 L  (0.52-1.04)  mg/dL


 


Glucose    114 H  (74-99)  mg/dL


 


POC Glucose (mg/dL)  112 H    ()  mg/dL








                      Microbiology - Last 24 Hours (Table)











 10/19/22 19:08 Blood Culture - Preliminary





 Blood    No Growth after 96 hours














Assessment and Plan


Plan: 





Assessment:


1.  Hyponatremia.  Slightly hypervolemic.  Also component of poor intake and 

SIADH from pain and infection.  Urine sodium 77 and urine osmolality 477.  TSH 

and cortisol normal.  Status post 3% this admission.  Sodium level 128 today.  

Patient also admits to drinking at least 64 ounces of water daily at home.


2.  Status post fall.


3.  COVID-19 infection.


4.  Benign hypertension.  Stable.


5.  Hypomagnesemia from poor intake.  Replaced.





Plan:


Maintain 1200 mL fluid resection.


Maintain sodium chloride tabs.


Maintain IV Lasix.  Hold for systolic blood pressure less than 100.


Samsca 7.5 mg once today.


Encouraged oral intake, particularly protein.


Repeat sodium level this evening.


Hold antihypertensives for systolic blood pressure less than 120.


Follow-up echocardiogram.


Check chest x-ray.

## 2022-10-24 NOTE — P.PN
Subjective


Progress Note Date: 10/24/22





This is an 82-year-old female with history of hypertension, coronary artery 

disease, diabetes, poor historian, patient presented to the ER yesterday shortly

after she was found by her neighbor in her garage, on the ground, attempting to 

crawl, and she had wet clothing and multiple areas of bruises throughout her 

whole body, and superficial abrasions.  Apparently the patient was very 

confused, she was hypothermic when she arrived to the ER with a temp of 84.  

Patient was also noted to have low sodium of 113.  Patient is normally on 

diuretics including Lasix 20 mg daily and on Aldactone 25 mg daily, she is also 

on multiple cardiac meds including Lipitor, Plavix, Coreg, and Zestril.  At any 

rate it is not clear how long with the patient down in the garage, however 

considering her altered mental status she had extensive scanning of her brain, 

and all came back nondiagnostic.  Patient also had a CT angiogram of the head, 

came back unremarkable.  Patient was admitted to the ICU mostly because of her 

hypothermia and her hyponatremia.  I felt that her hyponatremia is hypovolemic 

in nature, patient had low sodium of 113, and she had a relatively elevated 

urine osmolality.  Initially the patient was given 3% saline, however as she was

noted to have relatively low blood pressure, we recommended stopping the 3% 

saline and she was given a liter of lactated Ringer's.  Then her lactated 

Ringer's was changed to 75 mL per hour and follow up sodium this morning is 116.

 Patient will remain on lactated Ringer's at 50 mL per hour, and we will 

continue to monitor her sodium, no more 3% saline was given.  In the meantime 

patient was tested for COVID-19 infection, and sure enough she tested positive. 

Supposedly the patient is vaccinated and boosted.  Patient had no symptoms of 

shortness of breath, she had no pulmonary symptoms whatsoever, and her chest x-

ray is basically relatively unremarkable.  Hence this was an incidental COVID-19

positive finding





Patient was reevaluated today on 10/21/22, remains in the ICU, sodium remains 

low, today is 117, hence the patient was seen by nephrology and she is back on 

3% saline.  She is also on oral sodium tablets.  Clinically the patient is 

feeling better, she is not in any distress, she continues to hold relatively low

temperature, today I'm recommending thyroid profile and a serum cortisol level. 

Her cortisol level came back normal/12 and her thyroid profile is pending her 

WBC count is 4.1 hemoglobin is 9.5, electrolytes are normal except for low 

sodium of 117 renal profile is normal





Reevaluated today on 10/22/22, patient is gradually improving, her sodium is up 

to 121, this is being addressed by nephrology on the case.  Pulmonary-wise the 

patient is not in any distress, her mentation is significantly improved.  And I 

believe the COVID-19 infection is rather incidental.  Patient does not have any 

symptoms to suggest COVID-19 pneumonia.  Hence I'm planning to transfer the 

patient out of the ICU to a regular medical floor today.  Labs from today were 

all reviewed renal profile is normal BUN is 12 creatinine 0.45, bicarb is 27 and

sodium is 121





Reevaluated today on 10/23/22, patient is doing well, received 3% saline 

overnight, and now her sodium is 125.  Overall the patient is doing great, she 

is alert oriented 3, does not seem to be confused one bit.  Patient is now on 

overflow, she could be transferred to a regular medical floor once a bed is 

available.  Supposedly the patient has been on diuretics for LV dysfunction, I 

have not seen an echocardiogram on this patient after reviewing previous 

records, hence I'm recommending an echocardiogram on this patient to assess LV 

function.  Patient has been receiving diuretics by nephrology, and her sodium 

has been fluctuating all over the place.  At any rate I believe it is safe 

enough to transfer the patient out of the ICU once a bed is available.  

Pulmonary-wise remains on room air, she is not in any distress.  Blood pressure 

is marginal at her blood pressure medication is presently on hold.  Even her 

Lasix was put on hold earlier today





The patient is seen 10/24/2022 in follow-up in the intensive care unit.  She 

remains regular medical floor overflow.  She is sitting up in a chair at the 

bedside.  Awake and alert in no acute distress.  Maintaining O2 saturations in 

the 90s on room air.  No IV fluids.  Current sodium is 128.  White count 6.0.  

Hemoglobin 10.0.  BUN 14.  Creatinine 0.46.  Potassium 3.9.  Isai 114.  She is 

continued on vitamin supplements.  Chest x-ray shows no acute pulmonary process.

 No evidence of CoVID pneumonia.





Objective





- Vital Signs


Vital signs: 


                                   Vital Signs











Temp  97.5 F L  10/24/22 08:00


 


Pulse  84   10/24/22 08:00


 


Resp  17   10/24/22 08:00


 


BP  113/54   10/24/22 08:00


 


Pulse Ox  96   10/24/22 08:00


 


FiO2      








                                 Intake & Output











 10/23/22 10/24/22 10/24/22





 18:59 06:59 18:59


 


Intake Total 800  350


 


Output Total 1825 725 500


 


Balance -1025 -725 -150


 


Weight  60.5 kg 


 


Intake:   


 


  Intake, IV Titration 100  





  Amount   


 


    Magnesium Sulfate-D5w Pmx 100  





    1 gm In Dextrose/Water 1   





    100ml.bag @ 100 mls/hr   





    IVPB Q1H [facility] Rx#:   





    [id number]   


 


  Oral 700  350


 


Output:   


 


  Urine 1825 725 500


 


Other:   


 


  Voiding Method Indwelling Catheter Indwelling Catheter Indwelling Catheter














- Exam





GENERAL EXAM: Alert, pleasant 82-year-old female, on room air, comfortable in no

apparent distress.


HEAD: Normocephalic.


EYES: Normal reaction of pupils, equal size.


NOSE: Clear with pink turbinates.


THROAT: No erythema or exudates.


NECK: No masses, no JVD.


CHEST: No chest wall deformity.


LUNGS: Equal air entry with no crackles, wheeze, rhonchi or dullness.


CVS: S1 and S2 normal with no audible murmur, regular rhythm.


ABDOMEN: No hepatosplenomegaly, normal bowel sounds, no guarding or rigidity.


SPINE: No scoliosis or deformity


SKIN: No rashes


CENTRAL NERVOUS SYSTEM: No focal deficits, tone is normal in all 4 extremities.


EXTREMITIES: There is no peripheral edema.  No clubbing, no cyanosis.  

Peripheral pulses are intact.





- Labs


CBC & Chem 7: 


                                 10/24/22 06:43





                                 10/24/22 06:43


Labs: 


                  Abnormal Lab Results - Last 24 Hours (Table)











  10/23/22 10/23/22 10/23/22 Range/Units





  12:28 15:46 19:51 


 


RBC     (3.80-5.40)  m/uL


 


Hgb     (11.4-16.0)  gm/dL


 


Hct     (34.0-46.0)  %


 


Lymphocytes #     (1.0-4.8)  k/uL


 


Sodium   128 L   (137-145)  mmol/L


 


Chloride     ()  mmol/L


 


Creatinine     (0.52-1.04)  mg/dL


 


Glucose     (74-99)  mg/dL


 


POC Glucose (mg/dL)  116 H   168 H  ()  mg/dL


 


Hemoglobin A1c     (0.0-6.0)  %














  10/24/22 10/24/22 10/24/22 Range/Units





  06:12 06:43 06:43 


 


RBC    3.26 L  (3.80-5.40)  m/uL


 


Hgb    10.8 L  (11.4-16.0)  gm/dL


 


Hct    31.4 L  (34.0-46.0)  %


 


Lymphocytes #    0.3 L  (1.0-4.8)  k/uL


 


Sodium     (137-145)  mmol/L


 


Chloride     ()  mmol/L


 


Creatinine     (0.52-1.04)  mg/dL


 


Glucose     (74-99)  mg/dL


 


POC Glucose (mg/dL)  112 H    ()  mg/dL


 


Hemoglobin A1c   6.8 H   (0.0-6.0)  %














  10/24/22 10/24/22 Range/Units





  06:43 11:15 


 


RBC    (3.80-5.40)  m/uL


 


Hgb    (11.4-16.0)  gm/dL


 


Hct    (34.0-46.0)  %


 


Lymphocytes #    (1.0-4.8)  k/uL


 


Sodium  128 L   (137-145)  mmol/L


 


Chloride  93 L   ()  mmol/L


 


Creatinine  0.46 L   (0.52-1.04)  mg/dL


 


Glucose  114 H   (74-99)  mg/dL


 


POC Glucose (mg/dL)   124 H  ()  mg/dL


 


Hemoglobin A1c    (0.0-6.0)  %








                      Microbiology - Last 24 Hours (Table)











 10/19/22 19:08 Blood Culture - Preliminary





 Blood    No Growth after 96 hours














Assessment and Plan


Assessment: 





Hypovolemic hyponatremia, improving current sodium 128





Altered mental status consistent with acute metabolic encephalopathy, resolved





COVID-19 infection, however she has no active pulmonary symptoms.  Chest x-ray 

clear, on room air





History of benign essential hypertension





History of congestive heart failure, maintained on diuretics.





History of type 2 diabetes.





Coronary arteriosclerosis.





Dyslipidemia.





Multiple abrasions and bruises secondary to fall





Plan:





The patient was seen and evaluated


Chest x-ray medications and labs reviewed


Regular medical floor without telemetry


Awaiting placement for subacute rehab


We will continue to follow





I have personally seen and examined the patient, performed the documentation and

the assessment and plan as written.  Number of minutes spent on the visit: 10.

## 2022-10-24 NOTE — P.PN
Subjective


Progress Note Date: 10/23/22





Patient was seen for follow-up.  Patient is fully alert and awake, sitting in 

the recliner.  Patient is fully oriented.





Patient tells me regarding the fall.  Patient states that her neighbor Pita 

had an appointment with her about certain bills.  Patient states that she wanted

to get a different cane, that was laying in the barn garage.  She was using her 

older cane.  As she was heading to the barn garage, started pouring down.  She 

had a little, so she could reach the barn, but she stepped on some slippery 

[place] with [employer] in water, and her leg give out and she fell really hard face 

forward.  She started bleeding.  She tried to get up, but she was covered up in 

so many layers, that she could not get up with all her strength.  She laid there

for a couple hours until she passed out in cold temperature, and finally her 

neighbors found her and EMS was called as above.  There was no seizure.





Patient is healing very well.  Denies any new complaints.











Objective





- Vital Signs


Vital signs: 


                                   Vital Signs











Temp  97.7 F   10/23/22 20:00


 


Pulse  86   10/23/22 20:00


 


Resp  20   10/23/22 20:00


 


BP  122/60   10/23/22 20:00


 


Pulse Ox  96   10/23/22 20:00


 


FiO2      








                                 Intake & Output











 10/23/22 10/23/22 10/24/22





 06:59 18:59 06:59


 


Intake Total  800 


 


Output Total 1915 1825 75


 


Balance -1915 -1025 -75


 


Intake:   


 


  Intake, IV Titration  100 





  Amount   


 


    Magnesium Sulfate-D5w Pmx  100 





    1 gm In Dextrose/Water 1   





    100ml.bag @ 100 mls/hr   





    IVPB Q1H [facility] Rx#:   





    [id number]   


 


  Oral  700 


 


Output:   


 


  Urine 1915 1825 75


 


Other:   


 


  Voiding Method Indwelling Catheter Indwelling Catheter Indwelling Catheter














- Exam





Patient is alert and awake.  She knows her date of birth, and that she is in 

[facility].  She knows even the street address of the 

hospital.  Speech and language functions are normal.  Attention, concentration 

and fund of knowledge is adequate.  Cranial nerves are all normal.  Visual 

fields are full with no neglect.  Face is symmetric and tongue protrudes the 

midline.





Muscle strength is normal.  No ataxia.  Tone and bulk of muscles normal.  

Sensations are equal with no neglect.





- Labs


CBC & Chem 7: 


                                 10/23/22 06:20





                                 10/23/22 15:46


Labs: 


                  Abnormal Lab Results - Last 24 Hours (Table)











  10/23/22 10/23/22 10/23/22 Range/Units





  00:11 06:20 06:20 


 


RBC    3.29 L  (3.80-5.40)  m/uL


 


Hgb    11.2 L  (11.4-16.0)  gm/dL


 


Hct    31.1 L  (34.0-46.0)  %


 


Lymphocytes #    0.3 L  (1.0-4.8)  k/uL


 


Sodium  122 L  125 L   (137-145)  mmol/L


 


Chloride   91 L   ()  mmol/L


 


Glucose   107 H   (74-99)  mg/dL


 


POC Glucose (mg/dL)     ()  mg/dL


 


Calcium   8.1 L   (8.4-10.2)  mg/dL


 


Magnesium   1.4 L   (1.6-2.3)  mg/dL


 


Total Protein   4.7 L   (6.3-8.2)  g/dL


 


Albumin   2.8 L   (3.5-5.0)  g/dL














  10/23/22 10/23/22 10/23/22 Range/Units





  06:44 12:28 15:46 


 


RBC     (3.80-5.40)  m/uL


 


Hgb     (11.4-16.0)  gm/dL


 


Hct     (34.0-46.0)  %


 


Lymphocytes #     (1.0-4.8)  k/uL


 


Sodium    128 L  (137-145)  mmol/L


 


Chloride     ()  mmol/L


 


Glucose     (74-99)  mg/dL


 


POC Glucose (mg/dL)  115 H  116 H   ()  mg/dL


 


Calcium     (8.4-10.2)  mg/dL


 


Magnesium     (1.6-2.3)  mg/dL


 


Total Protein     (6.3-8.2)  g/dL


 


Albumin     (3.5-5.0)  g/dL














  10/23/22 Range/Units





  19:51 


 


RBC   (3.80-5.40)  m/uL


 


Hgb   (11.4-16.0)  gm/dL


 


Hct   (34.0-46.0)  %


 


Lymphocytes #   (1.0-4.8)  k/uL


 


Sodium   (137-145)  mmol/L


 


Chloride   ()  mmol/L


 


Glucose   (74-99)  mg/dL


 


POC Glucose (mg/dL)  168 H  ()  mg/dL


 


Calcium   (8.4-10.2)  mg/dL


 


Magnesium   (1.6-2.3)  mg/dL


 


Total Protein   (6.3-8.2)  g/dL


 


Albumin   (3.5-5.0)  g/dL








                      Microbiology - Last 24 Hours (Table)











 10/19/22 19:08 Blood Culture - Preliminary





 Blood    No Growth after 96 hours














Assessment and Plan


Assessment: 





* Status post fall in cold weather due to stepping on puddle of water, laying 

  out in cold for hours (unknown duration) before passing out.


* Hypothermia, resolved


* Severe hyponatremia, improving


* Acute Covid-19 positive


* Hypotension, rule out sepsis, now improved.


* History of Hypertension


* Multiple abrasions and bruises secondary to fall.


Plan: 





* Patient remembers falling, and also subsequently laying on the floor.  It was 

  not a seizure.


* Patient's mentation at present is completely normal.  Her encephalopathy has 

  resolved.


* Treatment of hyponatremia and other medical conditions as per IM and other 

  specialties.  Sodium persisting much improved 128 today.  


* CTA of head and neck showed no stenosis or occlusion.


* B12 1414, folate 9.0(4-31), TSH 0.645 normal.  We will start folate 

  replacement.


* No other neurological workup indicated.  Neurologically clear.  Neurology will

  sign off.  Please call neurology if any other concerns.  Dr. Ralf Hannon 

  starting neurology service in the morning.

## 2022-10-25 VITALS
HEART RATE: 90 BPM | TEMPERATURE: 98.4 F | DIASTOLIC BLOOD PRESSURE: 64 MMHG | SYSTOLIC BLOOD PRESSURE: 109 MMHG | RESPIRATION RATE: 20 BRPM

## 2022-10-25 LAB
ALBUMIN SERPL-MCNC: 2.8 G/DL (ref 3.5–5)
ALP SERPL-CCNC: 54 U/L (ref 38–126)
ALT SERPL-CCNC: 27 U/L (ref 4–34)
ANION GAP SERPL CALC-SCNC: 6 MMOL/L
AST SERPL-CCNC: 20 U/L (ref 14–36)
BASOPHILS # BLD AUTO: 0 K/UL (ref 0–0.2)
BASOPHILS NFR BLD AUTO: 0 %
BUN SERPL-SCNC: 22 MG/DL (ref 7–17)
CALCIUM SPEC-MCNC: 8.4 MG/DL (ref 8.4–10.2)
CHLORIDE SERPL-SCNC: 93 MMOL/L (ref 98–107)
CO2 SERPL-SCNC: 29 MMOL/L (ref 22–30)
EOSINOPHIL # BLD AUTO: 0.1 K/UL (ref 0–0.7)
EOSINOPHIL NFR BLD AUTO: 1 %
ERYTHROCYTE [DISTWIDTH] IN BLOOD BY AUTOMATED COUNT: 2.88 M/UL (ref 3.8–5.4)
ERYTHROCYTE [DISTWIDTH] IN BLOOD: 12.7 % (ref 11.5–15.5)
GLUCOSE BLD-MCNC: 115 MG/DL (ref 70–110)
GLUCOSE BLD-MCNC: 194 MG/DL (ref 70–110)
GLUCOSE SERPL-MCNC: 108 MG/DL (ref 74–99)
HCT VFR BLD AUTO: 27.7 % (ref 34–46)
HGB BLD-MCNC: 9.5 GM/DL (ref 11.4–16)
LYMPHOCYTES # SPEC AUTO: 0.3 K/UL (ref 1–4.8)
LYMPHOCYTES NFR SPEC AUTO: 6 %
MAGNESIUM SPEC-SCNC: 1.7 MG/DL (ref 1.6–2.3)
MCH RBC QN AUTO: 33 PG (ref 25–35)
MCHC RBC AUTO-ENTMCNC: 34.3 G/DL (ref 31–37)
MCV RBC AUTO: 96.2 FL (ref 80–100)
MONOCYTES # BLD AUTO: 0.4 K/UL (ref 0–1)
MONOCYTES NFR BLD AUTO: 9 %
NEUTROPHILS # BLD AUTO: 4.1 K/UL (ref 1.3–7.7)
NEUTROPHILS NFR BLD AUTO: 80 %
PLATELET # BLD AUTO: 373 K/UL (ref 150–450)
POTASSIUM SERPL-SCNC: 4.1 MMOL/L (ref 3.5–5.1)
PROT SERPL-MCNC: 4.7 G/DL (ref 6.3–8.2)
SODIUM SERPL-SCNC: 128 MMOL/L (ref 137–145)
WBC # BLD AUTO: 5.1 K/UL (ref 3.8–10.6)

## 2022-10-25 RX ADMIN — Medication SCH MG: at 10:15

## 2022-10-25 RX ADMIN — Medication SCH MCG: at 10:17

## 2022-10-25 RX ADMIN — CLOPIDOGREL BISULFATE SCH MG: 75 TABLET ORAL at 10:15

## 2022-10-25 RX ADMIN — SODIUM CHLORIDE TAB 1 GM SCH GM: 1 TAB at 10:17

## 2022-10-25 RX ADMIN — FUROSEMIDE SCH: 10 INJECTION, SOLUTION INTRAMUSCULAR; INTRAVENOUS at 01:21

## 2022-10-25 RX ADMIN — FAMOTIDINE SCH MG: 20 TABLET, FILM COATED ORAL at 10:15

## 2022-10-25 RX ADMIN — OXYCODONE HYDROCHLORIDE AND ACETAMINOPHEN SCH MG: 500 TABLET ORAL at 10:15

## 2022-10-25 RX ADMIN — FOLIC ACID SCH MG: 1 TABLET ORAL at 10:15

## 2022-10-25 NOTE — PN
PROGRESS NOTE



SUBJECTIVE:

An 82-year-old white female with multiple abrasions on her face due to falling.  She

remains on diabetic medicines, hypertension medicines.  She has weakness and fatigue.

She is ready to go to the [facility] for physical therapy once cleared by pulmonologist

and cardiologist.  She was started on Aldactone and Lasix.  She came with severe

hypothermia to the ER, but she is now much better.  Sodium is up to 121.  She had

severe hyponatremia on admission, which is greatly improved.  She is sitting up in the

chair.  She has given appropriate answers.  Sodium is 128.  White count is 6,

hemoglobin is 10.  BUN is 14, creatinine 0.46.  Continues on vitamin supplements.

Chest x-ray, no pulmonary process, no evidence of COVID.



OBJECTIVE:

VITAL SIGNS:  Temperature 97.5, pulse 84, respiratory rate 16 to 18, blood pressure

113/54, O2 sat 96.

CARDIOVASCULAR:  S1, S2.

HEMATOLOGY:  Negative for Homans.

GI:  Soft.

NEUROLOGIC:  Alert and oriented x3.



ASSESSMENT:

1. Hyponatremia.

2. Severe hypothermia.

3. Hypovolemic hyponatremia.

4. Altered mental status.

5. COVID-19 infection.

6. History of benign essential hypertension.

7. History of congestive heart failure.

8. Type 2 diabetes mellitus.

9. Coronary arteriosclerosis.

10.Dyslipidemia.

11.Multiple abrasions.



PLAN:

Subacute rehab.  Continue current treatments.  Wait for Dr. Fenton to clear her from the

hyponatremia standpoint.  Switch to oral medicines.  Prognosis guarded.





MMODL / IJN: [id number] / Job#: [id number]

## 2022-10-25 NOTE — P.PN
Subjective





Patient is seen in follow-up for hyponatremia.  Resting in bed.   Sodium level 

stable at 128.  Receiving IV Lasix.  Nonoliguric.  Hemodynamically stable.  Oral

intake fair per nurse.





Vital signs are stable.


General: Awake.


HEENT: Bruising noted. 


LUNGS: Breath sounds decreased.


HEART: Rate and Rhythm are regular.


ABDOMEN: Soft, no distention.


EXTREMITITES: Generalized bruising noted.  Trace edema.





Objective





- Vital Signs


Vital signs: 


                                   Vital Signs











Temp  98.8 F   10/25/22 08:00


 


Pulse  86   10/25/22 08:00


 


Resp  22   10/25/22 08:00


 


BP  132/68   10/25/22 08:00


 


Pulse Ox  98   10/25/22 08:00


 


FiO2      








                                 Intake & Output











 10/24/22 10/25/22 10/25/22





 18:59 06:59 18:59


 


Intake Total 550 240 


 


Output Total 800 450 


 


Balance -250 -210 


 


Weight   60.5 kg


 


Intake:   


 


  Oral 550 240 


 


Output:   


 


  Urine 800 450 


 


Other:   


 


  Voiding Method Indwelling Catheter Indwelling Catheter 














- Labs


CBC & Chem 7: 


                                 10/25/22 06:49





                                 10/25/22 06:49


Labs: 


                  Abnormal Lab Results - Last 24 Hours (Table)











  10/24/22 10/24/22 10/24/22 Range/Units





  06:43 16:21 16:25 


 


RBC     (3.80-5.40)  m/uL


 


Hgb     (11.4-16.0)  gm/dL


 


Hct     (34.0-46.0)  %


 


Lymphocytes #     (1.0-4.8)  k/uL


 


Sodium    127 L  (137-145)  mmol/L


 


Chloride     ()  mmol/L


 


BUN     (7-17)  mg/dL


 


Creatinine     (0.52-1.04)  mg/dL


 


Glucose     (74-99)  mg/dL


 


POC Glucose (mg/dL)   162 H   ()  mg/dL


 


Hemoglobin A1c  6.8 H    (0.0-6.0)  %


 


Total Protein     (6.3-8.2)  g/dL


 


Albumin     (3.5-5.0)  g/dL














  10/25/22 10/25/22 10/25/22 Range/Units





  06:49 06:49 07:04 


 


RBC   2.88 L   (3.80-5.40)  m/uL


 


Hgb   9.5 L   (11.4-16.0)  gm/dL


 


Hct   27.7 L   (34.0-46.0)  %


 


Lymphocytes #   0.3 L   (1.0-4.8)  k/uL


 


Sodium  128 L    (137-145)  mmol/L


 


Chloride  93 L    ()  mmol/L


 


BUN  22 H    (7-17)  mg/dL


 


Creatinine  0.49 L    (0.52-1.04)  mg/dL


 


Glucose  108 H    (74-99)  mg/dL


 


POC Glucose (mg/dL)    115 H  ()  mg/dL


 


Hemoglobin A1c     (0.0-6.0)  %


 


Total Protein  4.7 L    (6.3-8.2)  g/dL


 


Albumin  2.8 L    (3.5-5.0)  g/dL








                      Microbiology - Last 24 Hours (Table)











 10/19/22 19:08 Blood Culture - Preliminary





 Blood    No Growth after 120 hours














Assessment and Plan


Plan: 





Assessment:


1.  Hyponatremia.  Slightly hypervolemic.  Also component of poor intake and 

SIADH from pain and infection.  Urine sodium 77 and urine osmolality 477.  TSH 

and cortisol normal.  Status post 3% this admission.  Sodium level 128 today.  

Patient also admits to drinking at least 64 ounces of water daily at home.


2.  Status post fall.


3.  COVID-19 infection.


4.  Benign hypertension.  Stable.


5.  Hypomagnesemia from poor intake.  Replaced.


6.  Acute systolic CHF with ejection fraction of 15-20%.





Plan:


Maintain 1200 mL fluid resection.


Maintain sodium chloride tabs - decrease to once daily due to low EF.


Repeat samsca today. 


Hold Lasix.  No evidence of fluid overload on chest x-ray.


Encouraged oral intake, particularly protein.


Hold antihypertensives for systolic blood pressure less than 120.


Repeat urine studies.

## 2022-10-25 NOTE — P.PN
Subjective


Progress Note Date: 10/25/22





This is an 82-year-old female with history of hypertension, coronary artery 

disease, diabetes, poor historian, patient presented to the ER yesterday shortly

after she was found by her neighbor in her garage, on the ground, attempting to 

crawl, and she had wet clothing and multiple areas of bruises throughout her 

whole body, and superficial abrasions.  Apparently the patient was very 

confused, she was hypothermic when she arrived to the ER with a temp of 84.  

Patient was also noted to have low sodium of 113.  Patient is normally on 

diuretics including Lasix 20 mg daily and on Aldactone 25 mg daily, she is also 

on multiple cardiac meds including Lipitor, Plavix, Coreg, and Zestril.  At any 

rate it is not clear how long with the patient down in the garage, however 

considering her altered mental status she had extensive scanning of her brain, 

and all came back nondiagnostic.  Patient also had a CT angiogram of the head, 

came back unremarkable.  Patient was admitted to the ICU mostly because of her 

hypothermia and her hyponatremia.  I felt that her hyponatremia is hypovolemic 

in nature, patient had low sodium of 113, and she had a relatively elevated 

urine osmolality.  Initially the patient was given 3% saline, however as she was

noted to have relatively low blood pressure, we recommended stopping the 3% 

saline and she was given a liter of lactated Ringer's.  Then her lactated 

Ringer's was changed to 75 mL per hour and follow up sodium this morning is 116.

 Patient will remain on lactated Ringer's at 50 mL per hour, and we will 

continue to monitor her sodium, no more 3% saline was given.  In the meantime 

patient was tested for COVID-19 infection, and sure enough she tested positive. 

Supposedly the patient is vaccinated and boosted.  Patient had no symptoms of 

shortness of breath, she had no pulmonary symptoms whatsoever, and her chest x-

ray is basically relatively unremarkable.  Hence this was an incidental COVID-19

positive finding





Patient was reevaluated today on 10/21/22, remains in the ICU, sodium remains 

low, today is 117, hence the patient was seen by nephrology and she is back on 

3% saline.  She is also on oral sodium tablets.  Clinically the patient is 

feeling better, she is not in any distress, she continues to hold relatively low

temperature, today I'm recommending thyroid profile and a serum cortisol level. 

Her cortisol level came back normal/12 and her thyroid profile is pending her 

WBC count is 4.1 hemoglobin is 9.5, electrolytes are normal except for low 

sodium of 117 renal profile is normal





Reevaluated today on 10/22/22, patient is gradually improving, her sodium is up 

to 121, this is being addressed by nephrology on the case.  Pulmonary-wise the 

patient is not in any distress, her mentation is significantly improved.  And I 

believe the COVID-19 infection is rather incidental.  Patient does not have any 

symptoms to suggest COVID-19 pneumonia.  Hence I'm planning to transfer the 

patient out of the ICU to a regular medical floor today.  Labs from today were 

all reviewed renal profile is normal BUN is 12 creatinine 0.45, bicarb is 27 and

sodium is 121





Reevaluated today on 10/23/22, patient is doing well, received 3% saline 

overnight, and now her sodium is 125.  Overall the patient is doing great, she 

is alert oriented 3, does not seem to be confused one bit.  Patient is now on 

overflow, she could be transferred to a regular medical floor once a bed is 

available.  Supposedly the patient has been on diuretics for LV dysfunction, I 

have not seen an echocardiogram on this patient after reviewing previous 

records, hence I'm recommending an echocardiogram on this patient to assess LV 

function.  Patient has been receiving diuretics by nephrology, and her sodium 

has been fluctuating all over the place.  At any rate I believe it is safe 

enough to transfer the patient out of the ICU once a bed is available.  

Pulmonary-wise remains on room air, she is not in any distress.  Blood pressure 

is marginal at her blood pressure medication is presently on hold.  Even her 

Lasix was put on hold earlier today





The patient is seen 10/24/2022 in follow-up in the intensive care unit.  She 

remains regular medical floor overflow.  She is sitting up in a chair at the 

bedside.  Awake and alert in no acute distress.  Maintaining O2 saturations in 

the 90s on room air.  No IV fluids.  Current sodium is 128.  White count 6.0.  

Hemoglobin 10.0.  BUN 14.  Creatinine 0.46.  Potassium 3.9.  Isai 114.  She is 

continued on vitamin supplements.  Chest x-ray shows no acute pulmonary process.

 No evidence of CoVID pneumonia.





The patient is seen today 10/25/2022 in follow-up in the intensive care unit.  

She has her regular medical floor overflow.  She is currently awake and alert 

resting comfortably in bed.  Denies any worsening shortness of breath, cough or 

congestion.  She is maintaining O2 saturations in the 90s on 4 L/m per nasal 

cannula.  No IV fluids.  She's afebrile.  Hemodynamically stable.  Sodium 

remains stable at 128.  Potassium 4.1.  BUN 22.  Creatinine 0.49.  White count 

5.1.  Hemoglobin 9.5.  She remains on vitamin supplements.





Objective





- Vital Signs


Vital signs: 


                                   Vital Signs











Temp  98.6 F   10/25/22 02:00


 


Pulse  88   10/25/22 02:00


 


Resp  14   10/25/22 02:00


 


BP  118/73   10/25/22 02:00


 


Pulse Ox  97   10/25/22 02:00


 


FiO2      








                                 Intake & Output











 10/24/22 10/25/22 10/25/22





 18:59 06:59 18:59


 


Intake Total 550 240 


 


Output Total 800 450 


 


Balance -250 -210 


 


Weight   60.5 kg


 


Intake:   


 


  Oral 550 240 


 


Output:   


 


  Urine 800 450 


 


Other:   


 


  Voiding Method Indwelling Catheter Indwelling Catheter 














- Exam





GENERAL EXAM: Alert, pleasant 82-year-old female, on 4 L nasal cannula, 

comfortable in no apparent distress.


HEAD: Normocephalic.


EYES: Normal reaction of pupils, equal size.


NOSE: Clear with pink turbinates.


THROAT: No erythema or exudates.


NECK: No masses, no JVD.


CHEST: No chest wall deformity.


LUNGS: Equal air entry with no crackles, wheeze, rhonchi or dullness.


CVS: S1 and S2 normal with no audible murmur, regular rhythm.


ABDOMEN: No hepatosplenomegaly, normal bowel sounds, no guarding or rigidity.


SPINE: No scoliosis or deformity


SKIN: No rashes


CENTRAL NERVOUS SYSTEM: No focal deficits, tone is normal in all 4 extremities.


EXTREMITIES: There is no peripheral edema.  No clubbing, no cyanosis.  

Peripheral pulses are intact.





- Labs


CBC & Chem 7: 


                                 10/25/22 06:49





                                 10/25/22 06:49


Labs: 


                  Abnormal Lab Results - Last 24 Hours (Table)











  10/24/22 10/24/22 10/24/22 Range/Units





  06:43 11:15 16:21 


 


RBC     (3.80-5.40)  m/uL


 


Hgb     (11.4-16.0)  gm/dL


 


Hct     (34.0-46.0)  %


 


Lymphocytes #     (1.0-4.8)  k/uL


 


Sodium     (137-145)  mmol/L


 


Chloride     ()  mmol/L


 


BUN     (7-17)  mg/dL


 


Creatinine     (0.52-1.04)  mg/dL


 


Glucose     (74-99)  mg/dL


 


POC Glucose (mg/dL)   124 H  162 H  ()  mg/dL


 


Hemoglobin A1c  6.8 H    (0.0-6.0)  %


 


Total Protein     (6.3-8.2)  g/dL


 


Albumin     (3.5-5.0)  g/dL














  10/24/22 10/25/22 10/25/22 Range/Units





  16:25 06:49 06:49 


 


RBC    2.88 L  (3.80-5.40)  m/uL


 


Hgb    9.5 L  (11.4-16.0)  gm/dL


 


Hct    27.7 L  (34.0-46.0)  %


 


Lymphocytes #    0.3 L  (1.0-4.8)  k/uL


 


Sodium  127 L  128 L   (137-145)  mmol/L


 


Chloride   93 L   ()  mmol/L


 


BUN   22 H   (7-17)  mg/dL


 


Creatinine   0.49 L   (0.52-1.04)  mg/dL


 


Glucose   108 H   (74-99)  mg/dL


 


POC Glucose (mg/dL)     ()  mg/dL


 


Hemoglobin A1c     (0.0-6.0)  %


 


Total Protein   4.7 L   (6.3-8.2)  g/dL


 


Albumin   2.8 L   (3.5-5.0)  g/dL














  10/25/22 Range/Units





  07:04 


 


RBC   (3.80-5.40)  m/uL


 


Hgb   (11.4-16.0)  gm/dL


 


Hct   (34.0-46.0)  %


 


Lymphocytes #   (1.0-4.8)  k/uL


 


Sodium   (137-145)  mmol/L


 


Chloride   ()  mmol/L


 


BUN   (7-17)  mg/dL


 


Creatinine   (0.52-1.04)  mg/dL


 


Glucose   (74-99)  mg/dL


 


POC Glucose (mg/dL)  115 H  ()  mg/dL


 


Hemoglobin A1c   (0.0-6.0)  %


 


Total Protein   (6.3-8.2)  g/dL


 


Albumin   (3.5-5.0)  g/dL








                      Microbiology - Last 24 Hours (Table)











 10/19/22 19:08 Blood Culture - Preliminary





 Blood    No Growth after 120 hours














Assessment and Plan


Assessment: 





Hypovolemic hyponatremia, improving current sodium 128





Altered mental status consistent with acute metabolic encephalopathy, resolved





COVID-19 infection, however she has no active pulmonary symptoms.  Chest x-ray 

clear





History of benign essential hypertension





History of congestive heart failure, maintained on diuretics.





History of type 2 diabetes.





Coronary arteriosclerosis.





Dyslipidemia.





Multiple abrasions and bruises secondary to fall





Plan:





The patient was seen and evaluated


Cleared for discharge from the pulmonary standpoint


Awaiting placement for subacute rehab








I have personally seen and examined the patient, performed the documentation and

the assessment and plan as written.  Number of minutes spent on the visit: 10.

## 2022-10-26 NOTE — DS
DISCHARGE SUMMARY



DISCHARGE MEDICATIONS:

1. Folic acid 1 mg daily.

2. Sodium chloride 1 g _____.

3. Xanax 0.5 mg q.8 hours p.r.n.

4. Artificial Tears in both eyes every 4 hours p.r.n.

5. Metformin 500 b.i.d.

6. _____ b.i.d.

7. _____ daily.

8. Pepcid 20 mg b.i.d.

9. Acetaminophen 1000 mg q.6 hours p.r.n.

10.Vitamin C 500 daily.

11.D3 10 mcg p.o. daily.

12.Plavix 75 mg daily.

13.Lipitor 20 mg daily.

14.Carvedilol 6.25 b.i.d.

15.Lasix 20 mg daily.

16.Zestril 10 mg daily.



CONDITION:

Stable.



PROGNOSIS:

Guarded.



FOLLOWUP:

Follow up at [facility], Dr. Jaswant Prescott.



LABORATORY DATA:

Hemoglobin on discharge 9.5, white count 5.1.  Sodium was 128, potassium 4.1, BUN is

22, creatinine 0.49.



DISCHARGE DIAGNOSES:

1. Hypovolemic hyponatremia.

2. Severe hypothermia on admission.

3. Altered mental status, consistent with acute metabolic encephalopathy.

4. COVID-19 infection.

5. Benign hypertension.

6. History of congestive heart failure.

7. Abrasions and contusions from a fall and facial bruising.

8. Type 2 diabetes mellitus.

9. Coronary atherosclerosis.

10.Dyslipidemia.

The patient was cleared from discharge; Pulmonary, Cardiology, Infectious Disease.

PT/OT _____ [facility] will be necessary.  Echo during her stay showed 15% to 20% ejection

fraction for which she follows up with Cardiology.  Prognosis is extremely guarded due

to her condition of protein-calorie malnutrition, severe hyponatremia, systolic

congestive heart failure, recent COVID with generalized weakness and gait dysfunction.

Follow up with  _____ at [facility].  Diet as tolerated. Condition stable.  Prognosis

guarded.  She will have to go home on 2 L oxygen.  She is currently saturating at 98%

on 2 L.  She will continue on 2 L of oxygen and wean off as tolerated.





MMODL / IJN: [id number] / Job#: [id number]

## 2022-10-27 NOTE — CDI
Documentation Clarification Form



Date: 10/27/22

From: Doris Lopez 

MRN: [id number]

Admit Date: 10/19/2022 10:05:00 PM

Patient Name: Constanza Paulino

Visit Number: [id number]

Discharge Date:  10/25/2022 03:07:00 PM





ATTENTION: The Clinical Documentation Specialists (CDI) and [facility] Coding Staff 
appreciate your assistance in clarifying documentation. Please respond to the 
clarification below the line at the bottom and electronically sign. The CDI & 
[facility] Coding staff will review the response and follow-up if needed. Please note: 
Queries are made part of the Legal Health Record. If you have any questions, 
please contact the author of this message via ITS.



Dr. Jaswant Prescott,



Protein-calorie malnutrition is documented in the history and physical and 
discharge summary.



Additional clarification regarding the severity of malnutrition is requested.



History/Risk Factors: hypothermia, metabolic encephalopathy, COVID 19, acute 
systolic CHF w HTN, hypotension, T2DM W hyperglycemia, multiple abrasions



Clinical Indicators:  Generalized weakness and gait dysfunction. Poor appetite. 
Eating 60-75% of meals, consistent CHO, healthy heart diet, 1200 ml fluid 
restriction.



TOTAL PROTEIN: 5.1,4.7, 4.1, 4.5, 4.7, 4.7  ALBUMIN:  3.1, 2.9, 2.4, 2.7, 2.8, 
2.8

Current BMI:  21.5



Treatment: Increased PO intake from 50-75%.  Eating 60-75% of meals, consistent 
CHO, healthy heart diet, 1200 ml fluid restriction. Glucerna



Please clarify the type of malnutrition, if known:

[  ]  Mild Protein-Calorie Malnutrition 

[  ]  Moderate Protein-Calorie Malnutrition

[  ]  Severe Protein-Calorie Malnutrition

[  ]  Malnutrition, unspecified

[  ]  Unable to Determine

___________________________________________________________________________

MTDD

## 2022-11-01 NOTE — CDI
Documentation Clarification Form



Date: 11/1/22

From: Doris Lopez

MRN: [id number]

Admit Date: 10/19/2022 10:05:00 PM

Patient Name: Constanza Paulino

Visit Number: [id number]

Discharge Date:  10/25/2022 03:07:00 PM





ATTENTION: The Clinical Documentation Specialists (CDI) and [facility] Coding Staff 
appreciate your assistance in clarifying documentation. Please respond to the 
clarification below the line at the bottom and electronically sign. The CDI & 
[facility] Coding staff will review the response and follow-up if needed. Please note: 
Queries are made part of the Legal Health Record. If you have any questions, 
please contact the author of this message via ITS.



Dr. Jaswant Prescott,



Conflicting documentation has been found in the medical record.  As attending 
physician, please provide clarification.



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

Per your documentation the patient has severe hyponatremia. 



History/Risk Factors:  HTN w acute systolic CHF, mod PCM, hypothermia, COVID-19,
T2DM w hyperglycemia



Clinical Indicators:  Hyponatremia, slightly hypervolemic. Also component of 
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 Urine sodium 77 and urine 
osmolality 477. TSH and cortisol normal. 



Treatment:  1200 ml fluids, maintain sodium chloride tabs, add IV Lasix 20 mg 
twice daily, hold antihypertensives for  systolic BP less than 120, 
echocardiogram



Please clarify which diagnosis is most appropriate:



[  ]  SIADH

[  ]  Hyponatremia

[  ]  Unable to determine 

___________________________________________________________________________

MTDD